# Patient Record
Sex: MALE | URBAN - METROPOLITAN AREA
[De-identification: names, ages, dates, MRNs, and addresses within clinical notes are randomized per-mention and may not be internally consistent; named-entity substitution may affect disease eponyms.]

---

## 2021-03-13 ENCOUNTER — EMERGENCY (EMERGENCY)
Facility: HOSPITAL | Age: 56
LOS: 1 days | Discharge: HOME | End: 2021-03-13
Attending: EMERGENCY MEDICINE | Admitting: EMERGENCY MEDICINE
Payer: MEDICAID

## 2021-03-13 VITALS
RESPIRATION RATE: 16 BRPM | DIASTOLIC BLOOD PRESSURE: 103 MMHG | HEART RATE: 92 BPM | TEMPERATURE: 98 F | SYSTOLIC BLOOD PRESSURE: 186 MMHG | OXYGEN SATURATION: 97 %

## 2021-03-13 PROCEDURE — 12011 RPR F/E/E/N/L/M 2.5 CM/<: CPT

## 2021-03-13 PROCEDURE — 99285 EMERGENCY DEPT VISIT HI MDM: CPT | Mod: 25

## 2021-03-13 RX ORDER — TETANUS TOXOID, REDUCED DIPHTHERIA TOXOID AND ACELLULAR PERTUSSIS VACCINE, ADSORBED 5; 2.5; 8; 8; 2.5 [IU]/.5ML; [IU]/.5ML; UG/.5ML; UG/.5ML; UG/.5ML
0.5 SUSPENSION INTRAMUSCULAR ONCE
Refills: 0 | Status: COMPLETED | OUTPATIENT
Start: 2021-03-13 | End: 2021-03-13

## 2021-03-13 NOTE — ED PROVIDER NOTE - PATIENT PORTAL LINK FT
You can access the FollowMyHealth Patient Portal offered by [facility] by registering at the following website: [email or web address]. By joining [facility]’s FollowMyHealth portal, you will also be able to view your health information using other applications (apps) compatible with our system.

## 2021-03-13 NOTE — ED PROVIDER NOTE - CLINICAL SUMMARY MEDICAL DECISION MAKING FREE TEXT BOX
Satya, PGY3- facial trauma with glasses causing lac. No LOC or asa/AC meds but intoxicated. No other injuries. Unsure of tetanus. Given intox will obtain cross sectional imaging of head/cspine/face. Adacel. Repair eyelid. Not concerning for globe injury or entrapment. Visual acuity intact.

## 2021-03-13 NOTE — ED PROVIDER NOTE - PHYSICAL EXAMINATION
General: alert, conversant, looks well, vitals reassuring  Head: atraumatic, normocephalic  Eyes: PERRL, EOMI, no scleral icterus  ENT: no epistaxis, moist mucous membranes, normal phonation, airway patent  Neck: full ROM, no midline ttp, trachea midline, no ecchymosis/hematoma   CV: RRR, no murmurs, HDS  Pulm: lungs CTA b/l, no wheezing, no respiratory distress  GI: abd soft, non tender, no guarding/rebound/masses  Back: normal ROM, no midline ttp, no signs of trauma  Extremities: normal ROM, joints stable, distal pulses intact, no edema  Neuro: awake, alert, knows self, hospital, situation, mildly intoxicated, PERRL, 5/5 strength in extremities   Derm: warm, dry, normal color, L eyelid laceration

## 2021-03-13 NOTE — ED PROVIDER NOTE - CARE PLAN
Principal Discharge DX:	Fall, initial encounter  Secondary Diagnosis:	Alcohol intoxication   Principal Discharge DX:	Fall, initial encounter  Secondary Diagnosis:	Alcohol intoxication  Secondary Diagnosis:	Laceration of eye region   Principal Discharge DX:	Laceration of eye region  Secondary Diagnosis:	Alcohol intoxication  Secondary Diagnosis:	Fall, initial encounter

## 2021-03-13 NOTE — ED PROVIDER NOTE - ATTENDING CONTRIBUTION TO CARE
57 y/o M with no significant PMH here after a fall at home.  Pt reports he had been drinking tonight (had 2-3 cups of whiskey) and had fallen forward.  Pt struck the left side of his head against the ground, was wearing glasses which broke and sustained a laceration to L face.  Pt denies LOC.  No complaints.  He is not a daily drinker.  No drug use.  Well appearing, lying in stretcher, awake and alert, nontoxic.  VSS.  (+)swelling and laceration lateral to left eyebrow, no active bleeding.  EOMI no proptosis or pain with movement.  Neck supple, no midline tenderness.  Spine normal.  Lungs cta bl.  Cards nl S1/S2, RRR, no MRG.  Abd soft ntnd.  All extremities are intact, FROM no gross deformities.  Plan for tdap, imaging, lac repair, reassess for sobriety.

## 2021-03-13 NOTE — ED PROVIDER NOTE - OBJECTIVE STATEMENT
56 yoM, denying PMHx, presenting to ED s/p fall with causing eye glasses to cause lac to L eye region. Had been drinking, multiple whisky drinks. No LOC. Denies any ASA or anti coagulation medications. Denies any visual changes. No pain with EOM. Denies other injuries from fall. No recent illness. Unsure of tetanus status.    caveat due to mild intoxication.

## 2021-03-13 NOTE — ED PROVIDER NOTE - NSFOLLOWUPINSTRUCTIONS_ED_ALL_ED_FT
You had a fall with an injury to your eye lid.    You had stitches placed.    Return to ER in 5 days for removal.    No injuries on your CT scans.    Please return to ER for new or concerning symptoms.

## 2021-03-13 NOTE — ED ADULT TRIAGE NOTE - CHIEF COMPLAINT QUOTE
c/o fall after 3 shots of whiskey. PT has laceration to L eye from glasses. Denies LOC, dizziness, N+V, ETOH withdrawal, blood thinners, PMHx. PT states he only drinks socially on weekends. c/o fall after 3 shots of whiskey. PT has laceration to L eye from glasses. Denies LOC, dizziness, N+V, ETOH withdrawal, blood thinners, PMHx. PT states he only drinks socially on weekends. Pt calm and cooperative.

## 2021-03-14 VITALS
SYSTOLIC BLOOD PRESSURE: 176 MMHG | OXYGEN SATURATION: 97 % | HEART RATE: 104 BPM | RESPIRATION RATE: 18 BRPM | DIASTOLIC BLOOD PRESSURE: 102 MMHG

## 2021-03-14 PROCEDURE — 70450 CT HEAD/BRAIN W/O DYE: CPT | Mod: 26

## 2021-03-14 PROCEDURE — 72125 CT NECK SPINE W/O DYE: CPT | Mod: 26

## 2021-03-14 PROCEDURE — 70486 CT MAXILLOFACIAL W/O DYE: CPT | Mod: 26

## 2021-03-14 RX ADMIN — TETANUS TOXOID, REDUCED DIPHTHERIA TOXOID AND ACELLULAR PERTUSSIS VACCINE, ADSORBED 0.5 MILLILITER(S): 5; 2.5; 8; 8; 2.5 SUSPENSION INTRAMUSCULAR at 00:11

## 2021-03-14 NOTE — ED PROCEDURE NOTE - PROCEDURE ADDITIONAL DETAILS
4 interrupted 5-0 prolene skin sutures placed without complications, patient given verbal instructions to follow up in 5 days for suture removal at PMD/UC/ED

## 2021-03-14 NOTE — ED PROCEDURE NOTE - ATTENDING CONTRIBUTION TO CARE
I have personally seen and examined this patient. I have fully participated in the care of this patient. I was present at all key portions of the procedure.

## 2021-03-14 NOTE — ED ADULT NURSE NOTE - OBJECTIVE STATEMENT
Pt arrives to room 12 with dried blood to left side of face near eye.  Pt reports he drank too much and possibly fell.  Pt denies LOC.  Pt states he drank 3 shots of whiskey.  Pt denies being on blood thinners    c/o fall after 3 shots of whiskey. PT has laceration to L eye from glasses. Denies LOC, dizziness, N+V, ETOH withdrawal, blood thinners, PMHx. PT states he only drinks socially on weekends. Pt calm and cooperative. Pt arrives to room 12 with dried blood to left side of face near eye.  Pt reports he drank too much and possibly fell.  Pt denies LOC.  Pt states he drank 3 shots of whiskey.  Pt denies being on blood thinners.  Pt states he does not drink daily.  Pt medicated as per EMAR, pt awaiting CT.

## 2021-03-14 NOTE — ED ADULT NURSE NOTE - CHIEF COMPLAINT QUOTE
c/o fall after 3 shots of whiskey. PT has laceration to L eye from glasses. Denies LOC, dizziness, N+V, ETOH withdrawal, blood thinners, PMHx. PT states he only drinks socially on weekends. Pt calm and cooperative.

## 2021-03-16 ENCOUNTER — EMERGENCY (EMERGENCY)
Facility: HOSPITAL | Age: 56
LOS: 1 days | Discharge: HOME | End: 2021-03-16
Attending: EMERGENCY MEDICINE | Admitting: EMERGENCY MEDICINE
Payer: MEDICAID

## 2021-03-16 ENCOUNTER — INPATIENT (INPATIENT)
Facility: HOSPITAL | Age: 56
LOS: 2 days | Discharge: HOME | DRG: 516 | End: 2021-03-19
Attending: SURGERY | Admitting: SURGERY
Payer: MEDICAID

## 2021-03-16 VITALS
DIASTOLIC BLOOD PRESSURE: 114 MMHG | OXYGEN SATURATION: 100 % | TEMPERATURE: 99 F | RESPIRATION RATE: 16 BRPM | HEART RATE: 96 BPM | SYSTOLIC BLOOD PRESSURE: 198 MMHG

## 2021-03-16 VITALS
RESPIRATION RATE: 18 BRPM | DIASTOLIC BLOOD PRESSURE: 106 MMHG | OXYGEN SATURATION: 99 % | SYSTOLIC BLOOD PRESSURE: 184 MMHG | HEART RATE: 99 BPM | TEMPERATURE: 98 F

## 2021-03-16 VITALS
OXYGEN SATURATION: 93 % | SYSTOLIC BLOOD PRESSURE: 161 MMHG | HEART RATE: 98 BPM | RESPIRATION RATE: 18 BRPM | WEIGHT: 154.98 LBS | DIASTOLIC BLOOD PRESSURE: 105 MMHG

## 2021-03-16 LAB
ALBUMIN SERPL ELPH-MCNC: 4.1 G/DL — SIGNIFICANT CHANGE UP (ref 3.3–5)
ALBUMIN SERPL ELPH-MCNC: 4.1 G/DL — SIGNIFICANT CHANGE UP (ref 3.3–5)
ALP SERPL-CCNC: 100 U/L — SIGNIFICANT CHANGE UP (ref 40–120)
ALP SERPL-CCNC: 83 U/L — SIGNIFICANT CHANGE UP (ref 40–120)
ALT FLD-CCNC: 24 U/L — SIGNIFICANT CHANGE UP (ref 4–41)
ALT FLD-CCNC: 26 U/L — SIGNIFICANT CHANGE UP (ref 4–41)
ANION GAP SERPL CALC-SCNC: 12 MMOL/L — SIGNIFICANT CHANGE UP (ref 7–14)
ANION GAP SERPL CALC-SCNC: 9 MMOL/L — SIGNIFICANT CHANGE UP (ref 7–14)
APTT BLD: 19.2 SEC — LOW (ref 27–36.3)
AST SERPL-CCNC: 23 U/L — SIGNIFICANT CHANGE UP (ref 4–40)
AST SERPL-CCNC: 49 U/L — HIGH (ref 4–40)
BASOPHILS # BLD AUTO: 0.03 K/UL — SIGNIFICANT CHANGE UP (ref 0–0.2)
BASOPHILS NFR BLD AUTO: 0.3 % — SIGNIFICANT CHANGE UP (ref 0–2)
BILIRUB SERPL-MCNC: 0.7 MG/DL — SIGNIFICANT CHANGE UP (ref 0.2–1.2)
BILIRUB SERPL-MCNC: 0.7 MG/DL — SIGNIFICANT CHANGE UP (ref 0.2–1.2)
BLD GP AB SCN SERPL QL: NEGATIVE — SIGNIFICANT CHANGE UP
BUN SERPL-MCNC: 15 MG/DL — SIGNIFICANT CHANGE UP (ref 7–23)
BUN SERPL-MCNC: 16 MG/DL — SIGNIFICANT CHANGE UP (ref 7–23)
CALCIUM SERPL-MCNC: 9.1 MG/DL — SIGNIFICANT CHANGE UP (ref 8.4–10.5)
CALCIUM SERPL-MCNC: 9.3 MG/DL — SIGNIFICANT CHANGE UP (ref 8.4–10.5)
CHLORIDE SERPL-SCNC: 100 MMOL/L — SIGNIFICANT CHANGE UP (ref 98–107)
CHLORIDE SERPL-SCNC: 97 MMOL/L — LOW (ref 98–107)
CO2 SERPL-SCNC: 25 MMOL/L — SIGNIFICANT CHANGE UP (ref 22–31)
CO2 SERPL-SCNC: 26 MMOL/L — SIGNIFICANT CHANGE UP (ref 22–31)
CREAT SERPL-MCNC: 0.72 MG/DL — SIGNIFICANT CHANGE UP (ref 0.5–1.3)
CREAT SERPL-MCNC: 0.77 MG/DL — SIGNIFICANT CHANGE UP (ref 0.5–1.3)
EOSINOPHIL # BLD AUTO: 0 K/UL — SIGNIFICANT CHANGE UP (ref 0–0.5)
EOSINOPHIL NFR BLD AUTO: 0 % — SIGNIFICANT CHANGE UP (ref 0–6)
ETHANOL SERPL-MCNC: <10 MG/DL — SIGNIFICANT CHANGE UP
GLUCOSE SERPL-MCNC: 92 MG/DL — SIGNIFICANT CHANGE UP (ref 70–99)
GLUCOSE SERPL-MCNC: 95 MG/DL — SIGNIFICANT CHANGE UP (ref 70–99)
HCT VFR BLD CALC: 45.9 % — SIGNIFICANT CHANGE UP (ref 39–50)
HGB BLD-MCNC: 14.8 G/DL — SIGNIFICANT CHANGE UP (ref 13–17)
IANC: 9.23 K/UL — HIGH (ref 1.5–8.5)
IMM GRANULOCYTES NFR BLD AUTO: 0.4 % — SIGNIFICANT CHANGE UP (ref 0–1.5)
INR BLD: 0.98 RATIO — SIGNIFICANT CHANGE UP (ref 0.88–1.16)
LYMPHOCYTES # BLD AUTO: 1.25 K/UL — SIGNIFICANT CHANGE UP (ref 1–3.3)
LYMPHOCYTES # BLD AUTO: 11 % — LOW (ref 13–44)
MCHC RBC-ENTMCNC: 29.6 PG — SIGNIFICANT CHANGE UP (ref 27–34)
MCHC RBC-ENTMCNC: 32.2 GM/DL — SIGNIFICANT CHANGE UP (ref 32–36)
MCV RBC AUTO: 91.8 FL — SIGNIFICANT CHANGE UP (ref 80–100)
MONOCYTES # BLD AUTO: 0.78 K/UL — SIGNIFICANT CHANGE UP (ref 0–0.9)
MONOCYTES NFR BLD AUTO: 6.9 % — SIGNIFICANT CHANGE UP (ref 2–14)
NEUTROPHILS # BLD AUTO: 9.23 K/UL — HIGH (ref 1.8–7.4)
NEUTROPHILS NFR BLD AUTO: 81.4 % — HIGH (ref 43–77)
NRBC # BLD: 0 /100 WBCS — SIGNIFICANT CHANGE UP
NRBC # FLD: 0 K/UL — SIGNIFICANT CHANGE UP
PLATELET # BLD AUTO: 252 K/UL — SIGNIFICANT CHANGE UP (ref 150–400)
POTASSIUM SERPL-MCNC: 4.4 MMOL/L — SIGNIFICANT CHANGE UP (ref 3.5–5.3)
POTASSIUM SERPL-MCNC: SIGNIFICANT CHANGE UP MMOL/L (ref 3.5–5.3)
POTASSIUM SERPL-SCNC: 4.4 MMOL/L — SIGNIFICANT CHANGE UP (ref 3.5–5.3)
POTASSIUM SERPL-SCNC: SIGNIFICANT CHANGE UP MMOL/L (ref 3.5–5.3)
PROT SERPL-MCNC: 7.5 G/DL — SIGNIFICANT CHANGE UP (ref 6–8.3)
PROT SERPL-MCNC: SIGNIFICANT CHANGE UP G/DL (ref 6–8.3)
PROTHROM AB SERPL-ACNC: 11.2 SEC — SIGNIFICANT CHANGE UP (ref 10.6–13.6)
RBC # BLD: 5 M/UL — SIGNIFICANT CHANGE UP (ref 4.2–5.8)
RBC # FLD: 14.2 % — SIGNIFICANT CHANGE UP (ref 10.3–14.5)
RH IG SCN BLD-IMP: POSITIVE — SIGNIFICANT CHANGE UP
SODIUM SERPL-SCNC: 131 MMOL/L — LOW (ref 135–145)
SODIUM SERPL-SCNC: 138 MMOL/L — SIGNIFICANT CHANGE UP (ref 135–145)
TROPONIN T, HIGH SENSITIVITY RESULT: <6 NG/L — SIGNIFICANT CHANGE UP
WBC # BLD: 11.34 K/UL — HIGH (ref 3.8–10.5)
WBC # FLD AUTO: 11.34 K/UL — HIGH (ref 3.8–10.5)

## 2021-03-16 PROCEDURE — 71045 X-RAY EXAM CHEST 1 VIEW: CPT | Mod: 26

## 2021-03-16 PROCEDURE — 71250 CT THORAX DX C-: CPT | Mod: 26

## 2021-03-16 PROCEDURE — 73030 X-RAY EXAM OF SHOULDER: CPT | Mod: 26,LT

## 2021-03-16 PROCEDURE — 99285 EMERGENCY DEPT VISIT HI MDM: CPT

## 2021-03-16 RX ORDER — LIDOCAINE 4 G/100G
1 CREAM TOPICAL ONCE
Refills: 0 | Status: COMPLETED | OUTPATIENT
Start: 2021-03-16 | End: 2021-03-16

## 2021-03-16 RX ORDER — MORPHINE SULFATE 50 MG/1
4 CAPSULE, EXTENDED RELEASE ORAL ONCE
Refills: 0 | Status: DISCONTINUED | OUTPATIENT
Start: 2021-03-16 | End: 2021-03-16

## 2021-03-16 RX ORDER — SODIUM CHLORIDE 9 MG/ML
1000 INJECTION INTRAMUSCULAR; INTRAVENOUS; SUBCUTANEOUS ONCE
Refills: 0 | Status: COMPLETED | OUTPATIENT
Start: 2021-03-16 | End: 2021-03-16

## 2021-03-16 RX ADMIN — MORPHINE SULFATE 4 MILLIGRAM(S): 50 CAPSULE, EXTENDED RELEASE ORAL at 21:25

## 2021-03-16 RX ADMIN — SODIUM CHLORIDE 1000 MILLILITER(S): 9 INJECTION INTRAMUSCULAR; INTRAVENOUS; SUBCUTANEOUS at 21:26

## 2021-03-16 RX ADMIN — LIDOCAINE 1 PATCH: 4 CREAM TOPICAL at 17:31

## 2021-03-16 NOTE — ED PROVIDER NOTE - PHYSICAL EXAMINATION
General: Well developed, well nourished  HEENT: Normocephalic. Ecchymosis around L eye with laceration repair, EOMI, Trachea midline.   Cardiac: Normal S1 and S2 w/ RRR. No MRG.  Pulmonary: CTA bilaterally. No increased WOB, speaking in full sentences.   Abdominal: Soft, NTND  Neurologic: Strength 5/5 in all extremities. No focal sensory or motor deficits.  Musculoskeletal: tenderness over L clavicle, L scapula and L ribs. Immobilized in sling  Vascular: Warm and well perfused  Skin: Color appropriate for race.   Psychiatric: Appropriate mood and affect. No apparent risk to self or others.  Rolf Delgadillo M.D. PGY-3 General: Well developed, well nourished  HEENT: Normocephalic. Ecchymosis around L eye with laceration repair, EOMI, Trachea midline.   Cardiac: Normal S1 and S2 w/ RRR. No MRG.  Pulmonary: CTA bilaterally. No increased WOB, speaking in full sentences.   Abdominal: Soft, NTND  Neurologic: Strength 5/5 in all extremities. No focal sensory or motor deficits.  Musculoskeletal: tenderness over L clavicle, L scapula and L ribs. Immobilized in sling to left arm, intact peripheral pulses, intact motor and sensation to hand of left arm  Vascular: Warm and well perfused  Skin: Color appropriate for race.   Psychiatric: Appropriate mood and affect. No apparent risk to self or others.  Rolf Delgadillo M.D. PGY-3

## 2021-03-16 NOTE — ED PROVIDER NOTE - CLINICAL SUMMARY MEDICAL DECISION MAKING FREE TEXT BOX
56M presents with multiple rib fractures, clavicle and scapular fracture after a fall on Saturday. Currently hemodynamically stable, breathing on RA, pain controlled. Will discuss with trauma surg. May require admission for multiple rib fx. 56M presents with multiple rib fractures, clavicle and scapular fracture after a fall on Saturday. Currently hemodynamically stable, breathing on RA, pain controlled. Will discuss with trauma surg. May require admission for multiple rib fx.  labs and imaging reviewed from transferring hospital, surgery consulted, will offer analgesia and antiemetic prn  Will follow up on surgical consult, reassess and disposition to the inpatient team as clinically indicated.

## 2021-03-16 NOTE — ED PROVIDER NOTE - OBJECTIVE STATEMENT
57 y/o M with no reported PMH presenting with CP. Patient was seen in ED on 3/13 s/p mechanical fall at a party, fell down 3 steps. Was evaluated in ED and DC home. Patient since then has had left shoulder and chest pain. Severe pain when trying to move left arm. Minimal relief with ibuprofen at home. No numbness, tingling, SOB, fevers, chills, n/v/d, abd pain, hematuria 55 y/o M with no reported PMH presenting with CP. Patient was seen in ED on 3/13 s/p mechanical fall at a party, fell down 3 steps. Was evaluated in ED and DC home with repair of facial laceration and negative CT head/cspine/face. Patient since then has had left shoulder and chest pain. Severe pain when trying to move left arm and deep inspiration. Minimal relief with ibuprofen at home. No numbness, tingling, SOB, fevers, chills, n/v/d, abd pain, hematuria    BERTHA

## 2021-03-16 NOTE — ED ADULT NURSE NOTE - OBJECTIVE STATEMENT
break coverage RN - pt received in room 2. Pt was seen here on Saturday s/p mechanical fall and discharged. Since then he has been having L sided chest pain and L shoulder/arm pain. Pt's L shoulder noted to be bruised, and pt having difficulty moving extremity. No numbness or tingling. Sensation intact bilaterally. Pt also has bruising to L eye. States he has chest pain specifically when he moves. Sinus tachycardiac on cardiac monitor. Resp even and unlabored. Denies SOB, trouble breathing, blood thinner use. Pending MD miller at this time. Will continue to monitor.

## 2021-03-16 NOTE — ED PROVIDER NOTE - PROGRESS NOTE DETAILS
Pt with multiple fracutres on chest ct,  will transfer for [facility] for trauma consult and management given polytrauma. Abd s/nt, no ecchymosis, will hold off on abd CT at this time and transfer, pt HDS.

## 2021-03-16 NOTE — ED ADULT TRIAGE NOTE - CHIEF COMPLAINT QUOTE
Pt presents to ED ambulatory from home with c/o chest pain x 3 days. Pt tripped and fell Saturday and was seen in this ED. Pt relates the next day he started having pain in the L side of chest. Pt presents to ED ambulatory from home with c/o chest pain x 3 days. Pt tripped and fell Saturday and was seen in this ED. Pt relates the next day he started having pain in the L side of chest. Cynthiajimbo Montano (sister) [phone].

## 2021-03-16 NOTE — ED ADULT NURSE NOTE - NSIMPLEMENTINTERV_GEN_ALL_ED
Implemented All Fall Risk Interventions:  [place] to call system. Call bell, personal items and telephone within reach. Instruct patient to call for assistance. Room bathroom lighting operational. Non-slip footwear when patient is off stretcher. Physically safe environment: no spills, clutter or unnecessary equipment. Stretcher in lowest position, wheels locked, appropriate side rails in place. Provide visual cue, wrist band, yellow gown, etc. Monitor gait and stability. Monitor for mental status changes and reorient to person, place, and time. Review medications for side effects contributing to fall risk. Reinforce activity limits and safety measures with patient and family.

## 2021-03-16 NOTE — ED PROVIDER NOTE - ATTENDING CONTRIBUTION TO CARE
See MDM above.  Patient endorsed to the surgical team at the time of admission. Based on patient's history and physical exam, as well as the results of today's workup, I feel that patient warrants admission to the hospital for further workup/evaluation and continued management. I discussed the findings of today's workup with the patient and addressed the patient's questions and concerns. The patient was agreeable with admission. Our team spoke with the inpatient receiving team who accepted the patient for admission and subsequently took over the patient's care at the time of admission. The receiving team will follow up on pending labs, analgesia, any clinical imaging results, ancillary findings, reassess, and disposition as clinically indicated. Details of patient and plan conveyed to receiving physician team and conveyed back for understanding. There were no questions at this time about the patient's status, disposition, and plan. Patient's care to be taken over by receiving physician team at this time, all decisions regarding the progression of care will be made at their discretion.

## 2021-03-16 NOTE — ED PROVIDER NOTE - NS ED ROS FT
REVIEW OF SYSTEMS:  General:  no fever, no chills  HEENT: no headache, no vision changes  Cardiac: no chest pain, no palpitations  Respiratory: no cough, no shortness of breath  Gastrointestinal: no abdominal pain, no nausea, no vomiting, no diarrhea  Genitourinary: no hematuria, no dysuria, no urinary frequency  Extremities: +left back and chest pain. no extremity swelling, no extremity pain  Neuro: no focal weakness, no numbness/tingling of the extremities, no decreased sensation  Heme: no easy bleeding, no easy bruising  Skin: no jaundice,  no rashes, no lesions  All other ROS as documented in HPI  -Rolf Delgadillo, PGY-3

## 2021-03-16 NOTE — ED PROVIDER NOTE - CLINICAL SUMMARY MEDICAL DECISION MAKING FREE TEXT BOX
DO Andres PGY-2: 55 y/o M presenting with ongoing shoulder pain after mechanical fall, will obtain plain films to eval for fx. Possible muscle tear. Lidocaine patch for pain control, dispo pending results DO Andres PGY-2: 55 y/o M presenting with ongoing shoulder pain after mechanical fall, will obtain plain films to eval for fx and chest CT for high suspicion for multiple rib fracture on left side.

## 2021-03-16 NOTE — ED PROVIDER NOTE - OBJECTIVE STATEMENT
56M presents as trauma transfer. Pt states he was drinking with his family on Saturday when he had a fall down steps. States he had a laceration which was repaired in the ER. After coming back from ER he started to have a lot of pain in his L back and ribs. Today he went back to [facility] and was found to have L scapular, L clavicular, and L 3-7 rib fx. States was given morphine at [facility] and pain is currently controlled.

## 2021-03-16 NOTE — ED PROVIDER NOTE - PHYSICAL EXAMINATION
gen: well appearing  Mentation: AAO x 3  psych: mood appropriate  ENT: airway patent  Eyes: conjunctivae clear bilaterally  Cardio: RRR, no m/r/g  Resp: normal BS b/l  GI: s/nt/nd  : no CVA tenderness  Neuro: sensation and motor function intact, CN 2-12 intact  Skin: ecchymosis over left shoulder; no ecchymosis over abdomen  MSK: severely limited ROM of left shoulder, unable to hold up; TTP over left clavicle; normal movement of all other extremities; no midline tenderness  Lymph/Vasc: no LE edema gen: well appearing  Mentation: AAO x 3  psych: mood appropriate  ENT: airway patent  Eyes: conjunctivae clear bilaterally, EOMI  Cardio: RRR, no m/r/g  Resp: normal BS b/l  GI: s/nt/nd  : no CVA tenderness  Neuro: no midline spinal TTP or step offs, sensation intact, full motor strength to all extremities with exception of lifting L arm 2/2 pain, CN 2-12 intact  Skin: ecchymosis over left shoulder and upper chest; no ecchymosis over abdomen  MSK: severely limited ROM of left shoulder and L lateral chest, unable to hold up; TTP over left clavicle; normal movement of all other extremities; no midline tenderness  Lymph/Vasc: no LE edema      BERTHA

## 2021-03-16 NOTE — ED ADULT NURSE NOTE - CHIEF COMPLAINT QUOTE
Pt presents to ED ambulatory from home with c/o chest pain x 3 days. Pt tripped and fell Saturday and was seen in this ED. Pt relates the next day he started having pain in the L side of chest. Cynthiajimbo Montano (sister) [phone].

## 2021-03-16 NOTE — ED PROVIDER NOTE - CARE PLAN
Principal Discharge DX:	Rib fractures  Secondary Diagnosis:	Scapular fracture  Secondary Diagnosis:	Clavicular fracture

## 2021-03-16 NOTE — ED PROVIDER NOTE - PROGRESS NOTE DETAILS
Kaye Wynne MD PGY-3  patient signed out to me pending surgical eval. spoke with surgery, patient can be admitted to sicu under dr. santacruz. no additional imaging needed at this time per surgery

## 2021-03-17 ENCOUNTER — TRANSCRIPTION ENCOUNTER (OUTPATIENT)
Age: 56
End: 2021-03-17

## 2021-03-17 DIAGNOSIS — S42.009A FRACTURE OF UNSPECIFIED PART OF UNSPECIFIED CLAVICLE, INITIAL ENCOUNTER FOR CLOSED FRACTURE: ICD-10-CM

## 2021-03-17 DIAGNOSIS — S42.109A FRACTURE OF UNSPECIFIED PART OF SCAPULA, UNSPECIFIED SHOULDER, INITIAL ENCOUNTER FOR CLOSED FRACTURE: ICD-10-CM

## 2021-03-17 DIAGNOSIS — Z02.9 ENCOUNTER FOR ADMINISTRATIVE EXAMINATIONS, UNSPECIFIED: ICD-10-CM

## 2021-03-17 DIAGNOSIS — D72.829 ELEVATED WHITE BLOOD CELL COUNT, UNSPECIFIED: ICD-10-CM

## 2021-03-17 DIAGNOSIS — S22.49XA MULTIPLE FRACTURES OF RIBS, UNSPECIFIED SIDE, INITIAL ENCOUNTER FOR CLOSED FRACTURE: ICD-10-CM

## 2021-03-17 DIAGNOSIS — I10 ESSENTIAL (PRIMARY) HYPERTENSION: ICD-10-CM

## 2021-03-17 PROBLEM — Z00.00 ENCOUNTER FOR PREVENTIVE HEALTH EXAMINATION: Status: ACTIVE | Noted: 2021-03-17

## 2021-03-17 LAB
ANION GAP SERPL CALC-SCNC: 12 MMOL/L — SIGNIFICANT CHANGE UP (ref 5–17)
APTT BLD: 30.1 SEC — SIGNIFICANT CHANGE UP (ref 27.5–35.5)
BLD GP AB SCN SERPL QL: NEGATIVE — SIGNIFICANT CHANGE UP
BUN SERPL-MCNC: 13 MG/DL — SIGNIFICANT CHANGE UP (ref 7–23)
CALCIUM SERPL-MCNC: 8.9 MG/DL — SIGNIFICANT CHANGE UP (ref 8.4–10.5)
CHLORIDE SERPL-SCNC: 102 MMOL/L — SIGNIFICANT CHANGE UP (ref 96–108)
CO2 SERPL-SCNC: 24 MMOL/L — SIGNIFICANT CHANGE UP (ref 22–31)
CREAT SERPL-MCNC: 0.68 MG/DL — SIGNIFICANT CHANGE UP (ref 0.5–1.3)
GLUCOSE SERPL-MCNC: 95 MG/DL — SIGNIFICANT CHANGE UP (ref 70–99)
HCT VFR BLD CALC: 45.1 % — SIGNIFICANT CHANGE UP (ref 39–50)
HGB BLD-MCNC: 14.7 G/DL — SIGNIFICANT CHANGE UP (ref 13–17)
INR BLD: 0.99 RATIO — SIGNIFICANT CHANGE UP (ref 0.88–1.16)
MAGNESIUM SERPL-MCNC: 2 MG/DL — SIGNIFICANT CHANGE UP (ref 1.6–2.6)
MCHC RBC-ENTMCNC: 30 PG — SIGNIFICANT CHANGE UP (ref 27–34)
MCHC RBC-ENTMCNC: 32.6 GM/DL — SIGNIFICANT CHANGE UP (ref 32–36)
MCV RBC AUTO: 92 FL — SIGNIFICANT CHANGE UP (ref 80–100)
NRBC # BLD: 0 /100 WBCS — SIGNIFICANT CHANGE UP (ref 0–0)
PHOSPHATE SERPL-MCNC: 3.1 MG/DL — SIGNIFICANT CHANGE UP (ref 2.5–4.5)
PLATELET # BLD AUTO: 218 K/UL — SIGNIFICANT CHANGE UP (ref 150–400)
POTASSIUM SERPL-MCNC: 3.7 MMOL/L — SIGNIFICANT CHANGE UP (ref 3.5–5.3)
POTASSIUM SERPL-SCNC: 3.7 MMOL/L — SIGNIFICANT CHANGE UP (ref 3.5–5.3)
PROTHROM AB SERPL-ACNC: 11.9 SEC — SIGNIFICANT CHANGE UP (ref 10.6–13.6)
RBC # BLD: 4.9 M/UL — SIGNIFICANT CHANGE UP (ref 4.2–5.8)
RBC # FLD: 14.1 % — SIGNIFICANT CHANGE UP (ref 10.3–14.5)
RH IG SCN BLD-IMP: POSITIVE — SIGNIFICANT CHANGE UP
SARS-COV-2 RNA SPEC QL NAA+PROBE: SIGNIFICANT CHANGE UP
SODIUM SERPL-SCNC: 138 MMOL/L — SIGNIFICANT CHANGE UP (ref 135–145)
WBC # BLD: 10.75 K/UL — HIGH (ref 3.8–10.5)
WBC # FLD AUTO: 10.75 K/UL — HIGH (ref 3.8–10.5)

## 2021-03-17 PROCEDURE — 99223 1ST HOSP IP/OBS HIGH 75: CPT

## 2021-03-17 RX ORDER — ENOXAPARIN SODIUM 100 MG/ML
40 INJECTION SUBCUTANEOUS EVERY 24 HOURS
Refills: 0 | Status: COMPLETED | OUTPATIENT
Start: 2021-03-17 | End: 2021-03-17

## 2021-03-17 RX ORDER — HYDROMORPHONE HYDROCHLORIDE 2 MG/ML
0.25 INJECTION INTRAMUSCULAR; INTRAVENOUS; SUBCUTANEOUS
Refills: 0 | Status: DISCONTINUED | OUTPATIENT
Start: 2021-03-17 | End: 2021-03-18

## 2021-03-17 RX ORDER — INFLUENZA VIRUS VACCINE 15; 15; 15; 15 UG/.5ML; UG/.5ML; UG/.5ML; UG/.5ML
0.5 SUSPENSION INTRAMUSCULAR ONCE
Refills: 0 | Status: DISCONTINUED | OUTPATIENT
Start: 2021-03-17 | End: 2021-03-19

## 2021-03-17 RX ORDER — OXYCODONE HYDROCHLORIDE 5 MG/1
2.5 TABLET ORAL EVERY 6 HOURS
Refills: 0 | Status: DISCONTINUED | OUTPATIENT
Start: 2021-03-17 | End: 2021-03-17

## 2021-03-17 RX ORDER — FOLIC ACID 0.8 MG
1 TABLET ORAL DAILY
Refills: 0 | Status: DISCONTINUED | OUTPATIENT
Start: 2021-03-17 | End: 2021-03-18

## 2021-03-17 RX ORDER — OXYCODONE HYDROCHLORIDE 5 MG/1
10 TABLET ORAL EVERY 4 HOURS
Refills: 0 | Status: DISCONTINUED | OUTPATIENT
Start: 2021-03-17 | End: 2021-03-17

## 2021-03-17 RX ORDER — HYDROMORPHONE HYDROCHLORIDE 2 MG/ML
0.5 INJECTION INTRAMUSCULAR; INTRAVENOUS; SUBCUTANEOUS ONCE
Refills: 0 | Status: DISCONTINUED | OUTPATIENT
Start: 2021-03-17 | End: 2021-03-17

## 2021-03-17 RX ORDER — KETOROLAC TROMETHAMINE 30 MG/ML
15 SYRINGE (ML) INJECTION EVERY 6 HOURS
Refills: 0 | Status: DISCONTINUED | OUTPATIENT
Start: 2021-03-17 | End: 2021-03-18

## 2021-03-17 RX ORDER — FOLIC ACID 0.8 MG
1 TABLET ORAL DAILY
Refills: 0 | Status: DISCONTINUED | OUTPATIENT
Start: 2021-03-17 | End: 2021-03-17

## 2021-03-17 RX ORDER — ACETAMINOPHEN 500 MG
975 TABLET ORAL EVERY 6 HOURS
Refills: 0 | Status: DISCONTINUED | OUTPATIENT
Start: 2021-03-17 | End: 2021-03-18

## 2021-03-17 RX ORDER — ACETAMINOPHEN 500 MG
975 TABLET ORAL EVERY 6 HOURS
Refills: 0 | Status: DISCONTINUED | OUTPATIENT
Start: 2021-03-17 | End: 2021-03-17

## 2021-03-17 RX ORDER — THIAMINE MONONITRATE (VIT B1) 100 MG
100 TABLET ORAL DAILY
Refills: 0 | Status: DISCONTINUED | OUTPATIENT
Start: 2021-03-17 | End: 2021-03-18

## 2021-03-17 RX ORDER — CHLORHEXIDINE GLUCONATE 213 G/1000ML
1 SOLUTION TOPICAL
Refills: 0 | Status: DISCONTINUED | OUTPATIENT
Start: 2021-03-18 | End: 2021-03-18

## 2021-03-17 RX ORDER — OXYCODONE HYDROCHLORIDE 5 MG/1
5 TABLET ORAL EVERY 4 HOURS
Refills: 0 | Status: DISCONTINUED | OUTPATIENT
Start: 2021-03-17 | End: 2021-03-17

## 2021-03-17 RX ORDER — SENNA PLUS 8.6 MG/1
2 TABLET ORAL AT BEDTIME
Refills: 0 | Status: DISCONTINUED | OUTPATIENT
Start: 2021-03-17 | End: 2021-03-18

## 2021-03-17 RX ORDER — POTASSIUM CHLORIDE 20 MEQ
20 PACKET (EA) ORAL ONCE
Refills: 0 | Status: COMPLETED | OUTPATIENT
Start: 2021-03-17 | End: 2021-03-17

## 2021-03-17 RX ORDER — POLYETHYLENE GLYCOL 3350 17 G/17G
17 POWDER, FOR SOLUTION ORAL DAILY
Refills: 0 | Status: DISCONTINUED | OUTPATIENT
Start: 2021-03-17 | End: 2021-03-18

## 2021-03-17 RX ORDER — IBUPROFEN 200 MG
400 TABLET ORAL EVERY 6 HOURS
Refills: 0 | Status: DISCONTINUED | OUTPATIENT
Start: 2021-03-17 | End: 2021-03-17

## 2021-03-17 RX ORDER — ACETAMINOPHEN 500 MG
1000 TABLET ORAL ONCE
Refills: 0 | Status: COMPLETED | OUTPATIENT
Start: 2021-03-17 | End: 2021-03-17

## 2021-03-17 RX ORDER — OXYCODONE HYDROCHLORIDE 5 MG/1
2.5 TABLET ORAL EVERY 4 HOURS
Refills: 0 | Status: DISCONTINUED | OUTPATIENT
Start: 2021-03-17 | End: 2021-03-18

## 2021-03-17 RX ORDER — AMLODIPINE BESYLATE 2.5 MG/1
10 TABLET ORAL DAILY
Refills: 0 | Status: DISCONTINUED | OUTPATIENT
Start: 2021-03-17 | End: 2021-03-18

## 2021-03-17 RX ORDER — LIDOCAINE 4 G/100G
1 CREAM TOPICAL DAILY
Refills: 0 | Status: DISCONTINUED | OUTPATIENT
Start: 2021-03-17 | End: 2021-03-17

## 2021-03-17 RX ORDER — SODIUM CHLORIDE 9 MG/ML
1000 INJECTION, SOLUTION INTRAVENOUS
Refills: 0 | Status: DISCONTINUED | OUTPATIENT
Start: 2021-03-17 | End: 2021-03-18

## 2021-03-17 RX ORDER — GABAPENTIN 400 MG/1
100 CAPSULE ORAL THREE TIMES A DAY
Refills: 0 | Status: DISCONTINUED | OUTPATIENT
Start: 2021-03-17 | End: 2021-03-17

## 2021-03-17 RX ORDER — LIDOCAINE 4 G/100G
1 CREAM TOPICAL DAILY
Refills: 0 | Status: DISCONTINUED | OUTPATIENT
Start: 2021-03-18 | End: 2021-03-18

## 2021-03-17 RX ADMIN — Medication 15 MILLIGRAM(S): at 15:09

## 2021-03-17 RX ADMIN — Medication 15 MILLIGRAM(S): at 10:00

## 2021-03-17 RX ADMIN — Medication 1 TABLET(S): at 11:43

## 2021-03-17 RX ADMIN — LIDOCAINE 1 PATCH: 4 CREAM TOPICAL at 02:42

## 2021-03-17 RX ADMIN — Medication 15 MILLIGRAM(S): at 22:00

## 2021-03-17 RX ADMIN — ENOXAPARIN SODIUM 40 MILLIGRAM(S): 100 INJECTION SUBCUTANEOUS at 06:01

## 2021-03-17 RX ADMIN — Medication 400 MILLIGRAM(S): at 02:30

## 2021-03-17 RX ADMIN — Medication 975 MILLIGRAM(S): at 18:27

## 2021-03-17 RX ADMIN — Medication 975 MILLIGRAM(S): at 05:58

## 2021-03-17 RX ADMIN — Medication 975 MILLIGRAM(S): at 23:29

## 2021-03-17 RX ADMIN — Medication 400 MILLIGRAM(S): at 05:58

## 2021-03-17 RX ADMIN — LIDOCAINE 1 PATCH: 4 CREAM TOPICAL at 14:48

## 2021-03-17 RX ADMIN — Medication 15 MILLIGRAM(S): at 09:42

## 2021-03-17 RX ADMIN — LIDOCAINE 1 PATCH: 4 CREAM TOPICAL at 07:00

## 2021-03-17 RX ADMIN — Medication 20 MILLIEQUIVALENT(S): at 06:46

## 2021-03-17 RX ADMIN — SENNA PLUS 2 TABLET(S): 8.6 TABLET ORAL at 21:43

## 2021-03-17 RX ADMIN — Medication 100 MILLIGRAM(S): at 11:43

## 2021-03-17 RX ADMIN — HYDROMORPHONE HYDROCHLORIDE 0.5 MILLIGRAM(S): 2 INJECTION INTRAMUSCULAR; INTRAVENOUS; SUBCUTANEOUS at 02:55

## 2021-03-17 RX ADMIN — Medication 975 MILLIGRAM(S): at 12:49

## 2021-03-17 RX ADMIN — AMLODIPINE BESYLATE 10 MILLIGRAM(S): 2.5 TABLET ORAL at 13:09

## 2021-03-17 RX ADMIN — Medication 1 MILLIGRAM(S): at 11:43

## 2021-03-17 RX ADMIN — SODIUM CHLORIDE 75 MILLILITER(S): 9 INJECTION, SOLUTION INTRAVENOUS at 23:35

## 2021-03-17 RX ADMIN — Medication 15 MILLIGRAM(S): at 15:22

## 2021-03-17 RX ADMIN — Medication 975 MILLIGRAM(S): at 11:43

## 2021-03-17 RX ADMIN — HYDROMORPHONE HYDROCHLORIDE 0.5 MILLIGRAM(S): 2 INJECTION INTRAMUSCULAR; INTRAVENOUS; SUBCUTANEOUS at 03:15

## 2021-03-17 RX ADMIN — Medication 15 MILLIGRAM(S): at 21:43

## 2021-03-17 RX ADMIN — POLYETHYLENE GLYCOL 3350 17 GRAM(S): 17 POWDER, FOR SOLUTION ORAL at 11:43

## 2021-03-17 RX ADMIN — Medication 975 MILLIGRAM(S): at 17:45

## 2021-03-17 NOTE — CONSULT NOTE ADULT - PROBLEM SELECTOR RECOMMENDATION 9
Found to have left 3-7 lateral rib fx minimally displaced with L 5-7 displaced posterior rib fx.   - Multimodal pain control  - IS  - OOB

## 2021-03-17 NOTE — OCCUPATIONAL THERAPY INITIAL EVALUATION ADULT - DIAGNOSIS, OT EVAL
Patient presents with decreased strength, ROM impacting ability to perform ADLs and functional mobility

## 2021-03-17 NOTE — H&P ADULT - ASSESSMENT
***  full note to follow    Plan:  -admit to Dr. Singh  -ortho consult for L clavicle and L scapula fractures, continue with LUE sling  -appreciate SICU admission  -reg diet  -multimodal pain control  -IS (750cc on admission)  -VTE ppx    Discussed with Dr. Francisco Latham PGY2  General Surgery    ACS  [id number] 56M otherwise healthy presents as trauma transfer from [facility] three days after mechanical fall down 3 steps. Pt states he was drinking with his family on Saturday when he had tripped and fell down 3 steps onto L side, with his glasses breaking on his face (frame broke, lens intact, no shattered glass). Went to ED, had normal CT head/c-spine, ED repaired L eyebrow facial lac and sent patient home. On 3/15, patient began having L chest wall pain and L collarbone pain, presented to [facility] ED, CT chest shows acute displaced L mid clavicle fracture, L scapular fracture, and L 3-7 lateral rib fx minimally displaced with L 5-7 displaced posterior rib fx. Transferred to [facility] for trauma surgery evaluation.     Plan:  -admit to Dr. Singh  -ortho consult for L clavicle and L scapula fractures, continue with LUE sling  -appreciate SICU admission  -reg diet  -multimodal pain control  -IS (750cc on admission)  -VTE ppx    Discussed with Dr. Francisco Latham PGY2  General Surgery    ACS  [id number]

## 2021-03-17 NOTE — H&P ADULT - ATTENDING COMMENTS
Pt seen and examined. Agree with A/P. Admit to ACS, SICU. Pain control, pulmonary toilet, ortho eval, lovenox, diet.

## 2021-03-17 NOTE — CONSULT NOTE ADULT - SUBJECTIVE AND OBJECTIVE BOX
TRAUMA COMANAGEMENT MEDICINE ATTENDING INITIAL CONSULT NOTE    HPI: imaging/labs found on [facility] chart MRN: [id number]  56M otherwise healthy presents as trauma transfer from [facility] three days after mechanical fall down 3 steps. Pt states he was drinking with his family on Saturday when he had tripped and fell down 3 steps onto L side, with his glasses breaking on his face (frame broke, lens intact, no shattered glass). Went to ED, had normal CT head/c-spine, ED repaired L eyebrow facial lac and sent patient home. On 3/15, patient began having L chest wall pain and L collarbone pain, presented to [facility] ED, CT chest shows acute displaced L mid clavicle fracture, L scapular fracture, and L 3-7 lateral rib fx minimally displaced with L 5-7 displaced posterior rib fx. Transferred to [facility] for trauma surgery evaluation. Denies fevers, chills, vision changes, nausea, vomiting, chest pain, shortness of breath, dizziness, lightheadedness.       SUBJECTIVE: Seen sitting in chair. Pain controlled currently. Does have left sided rib pain when he takes a deep breath. No headache, changes in vision, N/V     Home Medications:  Pt states he takes no medications because he has no insurance:  (17 Mar 2021 10:58)      PAST MEDICAL & SURGICAL HISTORY:  No pertinent past medical history    No significant past surgical history        Review of Systems:   CONSTITUTIONAL: No fever, weight loss, or fatigue  EYES: No eye pain, visual disturbances, or discharge  ENMT:  No difficulty hearing, tinnitus, vertigo; No sinus or throat pain  NECK: No pain or stiffness  RESPIRATORY: No cough, wheezing, chills or hemoptysis; No shortness of breath. + chest wall pain  CARDIOVASCULAR: No chest pain, palpitations, dizziness, or leg swelling  GASTROINTESTINAL: No abdominal or epigastric pain. No nausea, vomiting, or hematemesis; No diarrhea or constipation. No melena or hematochezia.  GENITOURINARY: No dysuria, frequency, hematuria, or incontinence  NEUROLOGICAL: No headaches, memory loss, loss of strength, numbness, or tremors  SKIN: No itching, burning, rashes, or lesions   LYMPH NODES: No enlarged glands  MUSCULOSKELETAL: No joint pain or swelling; No muscle, back, or extremity pain  PSYCHIATRIC: No depression, anxiety, mood swings, or difficulty sleeping  HEME/LYMPH: No easy bruising, or bleeding gums      Allergies    No Known Allergies    Intolerances        Social History: Social ETOH on weekends    FAMILY HISTORY:  History of CAD and hypertension in his father    Vital Signs Last 24 Hrs  T(C): 36.9 (17 Mar 2021 11:00), Max: 36.9 (16 Mar 2021 23:27)  T(F): 98.4 (17 Mar 2021 11:00), Max: 98.5 (16 Mar 2021 23:27)  HR: 92 (17 Mar 2021 11:00) (85 - 101)  BP: 180/91 (17 Mar 2021 11:00) (141/93 - 190/109)  BP(mean): 129 (17 Mar 2021 11:00) (113 - 143)  RR: 12 (17 Mar 2021 11:00) (12 - 29)  SpO2: 99% (17 Mar 2021 11:00) (93% - 100%)  CAPILLARY BLOOD GLUCOSE          PHYSICAL EXAM:  GENERAL: NAD, well-developed. LUE in sling  HEAD:  NCAT  EYES: EOMI  NECK: Supple, No JVD  CHEST/LUNG: Clear to auscultation bilaterally; No wheeze  HEART: Reg rate. No M/R/G  ABDOMEN: Soft, NT/ND, Bowel sounds present  EXTREMITIES:  2+ Peripheral Pulses, No clubbing, cyanosis, or edema  PSYCH: AAOx3  NEUROLOGY: non-focal  SKIN: No rashes or lesions    LABS:                        14.7   10.75 )-----------( 218      ( 17 Mar 2021 05:10 )             45.1     03-17    138  |  102  |  13  ----------------------------<  95  3.7   |  24  |  0.68    Ca    8.9      17 Mar 2021 05:10  Phos  3.1     03-17  Mg     2.0     03-17      PT/INR - ( 17 Mar 2021 05:11 )   PT: 11.9 sec;   INR: 0.99 ratio         PTT - ( 17 Mar 2021 05:11 )  PTT:30.1 sec            MEDICATIONS  (STANDING):  acetaminophen   Tablet .. 975 milliGRAM(s) Oral every 6 hours  enoxaparin Injectable 40 milliGRAM(s) SubCutaneous every 24 hours  folic acid 1 milliGRAM(s) Oral daily  ketorolac   Injectable 15 milliGRAM(s) IV Push every 6 hours  multivitamin 1 Tablet(s) Oral daily  polyethylene glycol 3350 17 Gram(s) Oral daily  senna 2 Tablet(s) Oral at bedtime  thiamine 100 milliGRAM(s) Oral daily    MEDICATIONS  (PRN):  HYDROmorphone  Injectable 0.25 milliGRAM(s) IV Push every 3 hours PRN Severe Pain (7 - 10)  oxyCODONE    IR 2.5 milliGRAM(s) Oral every 4 hours PRN Mild Pain (1 - 3)    Radiology:  3/14  CT BRAIN: No acute intracranial bleeding.  CT CERVICAL SPINE: No fracture. Mild C5-C6 and C6-C7 disc degeneration.  CT FACE: Left periorbital soft tissue hematoma. Intact globes.    3/16  EXAM:  CT CHEST    PROCEDURE DATE:  Mar 16 2021   INTERPRETATION:  CLINICAL INFORMATION: Chest trauma, moderate to severe, left mid and upper chest pain.  COMPARISON: None available.    FINDINGS:    LUNGS AND AIRWAYS: Patent central airways.  Lungs are clear. No pneumothorax.  PLEURA: Small left pleural effusion.  MEDIASTINUM AND JOJO: No lymphadenopathy.  VESSELS: Aortic calcifications.  HEART: Heart size is normal. No pericardial effusion.  CHEST WALL AND LOWER NECK: Subcutaneous edema/skin thickening likely from trauma overlying the region of the left clavicle.  VISUALIZED UPPER ABDOMEN: Within normal limits.  BONES: Acute minimally  fractures of the left third through seventh left lateral ribs. Acute displaced fractures of the posterior fifth through seventh left ribs. Acute minimally displaced fracture of the left scapula. Acute comminuted fracture of the middle left clavicle.    IMPRESSION:  Acute comminuted fracture of the middle left clavicle. Acute displaced fractures of the posterior fifth through seventh left ribs and minimally displaced fractures of the left third through seventh left lateral ribs. Acute minimally displaced fracture of the left scapula.    No pneumothorax.    Small left pleural effusion.

## 2021-03-17 NOTE — H&P ADULT - NSHPLABSRESULTS_GEN_ALL_CORE
IMAGING    3/14  CT BRAIN: No acute intracranial bleeding.  CT CERVICAL SPINE: No fracture. Mild C5-C6 and C6-C7 disc degeneration.  CT FACE: Left periorbital soft tissue hematoma. Intact globes.    3/16  EXAM:  CT CHEST    PROCEDURE DATE:  Mar 16 2021   INTERPRETATION:  CLINICAL INFORMATION: Chest trauma, moderate to severe, left mid and upper chest pain.  COMPARISON: None available.    FINDINGS:    LUNGS AND AIRWAYS: Patent central airways.  Lungs are clear. No pneumothorax.  PLEURA: Small left pleural effusion.  MEDIASTINUM AND JOJO: No lymphadenopathy.  VESSELS: Aortic calcifications.  HEART: Heart size is normal. No pericardial effusion.  CHEST WALL AND LOWER NECK: Subcutaneous edema/skin thickening likely from trauma overlying the region of the left clavicle.  VISUALIZED UPPER ABDOMEN: Within normal limits.  BONES: Acute minimally  fractures of the left third through seventh left lateral ribs. Acute displaced fractures of the posterior fifth through seventh left ribs. Acute minimally displaced fracture of the left scapula. Acute comminuted fracture of the middle left clavicle.    IMPRESSION:  Acute comminuted fracture of the middle left clavicle. Acute displaced fractures of the posterior fifth through seventh left ribs and minimally displaced fractures of the left third through seventh left lateral ribs. Acute minimally displaced fracture of the left scapula.    No pneumothorax.    Small left pleural effusion.      LAUREL HILL MD; Resident Radiology  This document has been electronically signed.  DINA ZAMAN MD; Attending Radiologist  This document has been electronically signed. Mar 16 2021  8:43PM

## 2021-03-17 NOTE — PATIENT PROFILE ADULT - NSTRANSFERBELONGINGSDISPO_GEN_A_NUR
Normal vision: sees adequately in most situations; can see medication labels, newsprint
not applicable

## 2021-03-17 NOTE — PHYSICAL THERAPY INITIAL EVALUATION ADULT - PLANNED THERAPY INTERVENTIONS, PT EVAL
stair neg: GOAL: pt will neg 4 steps ind in 2wks./bed mobility training/gait training/transfer training

## 2021-03-17 NOTE — OCCUPATIONAL THERAPY INITIAL EVALUATION ADULT - LIVES WITH, PROFILE
Lives in house with sister, 4 steps to enter +bilateral handrails, first floor setup available with bed and bath

## 2021-03-17 NOTE — OCCUPATIONAL THERAPY INITIAL EVALUATION ADULT - ADL RETRAINING, OT EVAL
GOAL: Pt will perform LB dressing independently in 4 weeks GOAL: Patient will be independent with UB dressing within 4 weeks

## 2021-03-17 NOTE — OCCUPATIONAL THERAPY INITIAL EVALUATION ADULT - LEVEL OF INDEPENDENCE: DRESS LOWER BODY, OT EVAL
educated on redd technique to don/doff socks, min A to initiate sock placement/minimum assist (75% patients effort)

## 2021-03-17 NOTE — CONSULT NOTE ADULT - ASSESSMENT
ASSESSMENT:  56y Male with no significant PMH presenting after falling down the stairs on Saturday s/p repair of L eyebrow laceration presenting now with worsening L sided rib pain. Found to have L scapula, L clavicle fractures, 3-7 rib fractures     PLAN:   Neurologic:   - Multimodal pain control with tylenol, ibuprofen, oxycodone and lidocaine patch  - Patient with history of drinking, per patient not daily drinker and no drink in last 2 days    Respiratory:   - Incentive spirometer (750)  - Multimodal pain control    Cardiovascular:   - No active issues     Gastrointestinal/Nutrition:   - Regular diet    Renal/Genitourinary:   - No active issues on trauma labs at [facility]    Hematologic:   - No active issues    Infectious Disease:   - No active issues     Lines/Tubes:  - PIVs    Endocrine:   - No active issues    Disposition: SICU    --------------------------------------------------------------------------------------    Critical Care Diagnoses: ASSESSMENT:  56y Male with no significant PMH presenting after falling down the stairs on Saturday s/p repair of L eyebrow laceration presenting now with worsening L sided rib pain. Found to have L scapula, L clavicle fractures, 3-7 rib fractures     PLAN:   Neurologic:   - Multimodal pain control with tylenol, ibuprofen, oxycodone and lidocaine patch  - Patient with history of drinking, per patient not daily drinker and no drink in last 2 days    Respiratory:   - Incentive spirometer (750)  - Multimodal pain control    Cardiovascular:   - No active issues     Gastrointestinal/Nutrition:   - Regular diet    Renal/Genitourinary:   - No active issues on trauma labs at [facility]    Hematologic:   - Hemoglobin 14.8  - Lovenox for DVT ppx    Infectious Disease:   - No active issues     Lines/Tubes:  - PIVs    Endocrine:   - No active issues    Disposition: SICU    --------------------------------------------------------------------------------------    Critical Care Diagnoses:

## 2021-03-17 NOTE — ED ADULT NURSE REASSESSMENT NOTE - NS ED NURSE REASSESS COMMENT FT1
Patient repositioned in bed for comfort. Denies complaints at this time. Re educated on use of call bell and patient verbalizes understanding.

## 2021-03-17 NOTE — OCCUPATIONAL THERAPY INITIAL EVALUATION ADULT - PRECAUTIONS/LIMITATIONS, REHAB EVAL
(continued) presented to [facility] ED, CT chest shows acute displaced L mid clavicle fracture, L scapular fracture, and L 3-7 lateral rib fx minimally displaced with L 5-7 displaced posterior rib fx. Transferred to [facility] for trauma surgery evaluation./fall precautions

## 2021-03-17 NOTE — CHART NOTE - NSCHARTNOTEFT_GEN_A_CORE
Discussed with attending, Dr. Bautista. Plan for ORIF of left clavicle tomorrow 3/18/2021. Please document SICU clearance for OR tomorrow. Please draw preop labs tomorrow AM for OR. Please hold DVT ppx and make pt NPO after midnight.
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
## 2021-03-17 NOTE — CONSULT NOTE ADULT - PROBLEM/RECOMMENDATION-4
Child arrives with parents who complain of runny nose, bleeding from rt nare.  Mother reports foul smell from exudate.    DISPLAY PLAN FREE TEXT

## 2021-03-17 NOTE — ED ADULT NURSE REASSESSMENT NOTE - NS ED NURSE REASSESS COMMENT FT1
Patient provided with new incentive spirometer and educated on use. Patient demonstrated understanding of how to use incentive spirometer via teach back method.
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
## 2021-03-17 NOTE — CONSULT NOTE ADULT - PROBLEM SELECTOR RECOMMENDATION 4
Blood pressures have been significantly elevated during hospital stay. On my interview pt did not appear to be in pain thus pain not the main  for elevated BP. Suspect pt with undiagnosed hypertension. Given degree of elevation suspect that patient will likely require at least a two drug regimen to bring his pressures to goal. SBP goal <140  - For now would start Norvasc 10mg daily  - Monitor BP, will introduce further antihypertensive therapy as needed Blood pressures have been significantly elevated during hospital stay. On my interview pt did not appear to be in pain thus pain not the main  for elevated BP. Suspect pt with undiagnosed hypertension. Given degree of elevation suspect that patient will likely require at least a two drug regimen to bring his pressures to goal. SBP goal <140  - For now would start Norvasc 10mg daily  - Hydralazine PRN SBP >190  - Monitor BP, will introduce further antihypertensive therapy (Likely Lisinopril) as needed

## 2021-03-17 NOTE — ED ADULT NURSE REASSESSMENT NOTE - NS ED NURSE REASSESS COMMENT FT1
ED MD Harvey made aware of patients' BP of 176/117. Per ED MD Harvey no intervention is recommended at this time. Patient is asymptomatic related to BP, no complaints of chest pain, headache, dizziness, or SOB.

## 2021-03-17 NOTE — CONSULT NOTE ADULT - PROBLEM SELECTOR RECOMMENDATION 6
Pt has not seen a physician since coming to the  in 2000. Will need PCP on discharge. Given lack of insurance pt could follow up at the medicine clinic at [address] [place] [id number] Phone: [phone]

## 2021-03-17 NOTE — SBIRT NOTE ADULT - NSSBIRTALCPOSREINDET_GEN_A_CORE
Patient reports drinking approximately 1-2 drinks on the weekends in social gatherings. Patient denies current/previous ETOH treatment and associated his drinking to be a problem at this time. Education provided on healthy guidelines and potential negative consequences with level of risk.  Patient stated he will stop drinking now as he has high blood pressure.
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
## 2021-03-17 NOTE — OCCUPATIONAL THERAPY INITIAL EVALUATION ADULT - PERTINENT HX OF CURRENT PROBLEM, REHAB EVAL
56M trauma transfer from [facility] 3 days after fall. Pt states he was drinking when he fell down 3 steps onto L side, with his glasses breaking on his face Went to ED, had normal CT head/c-spine, ED repaired L eyebrow facial lac and sent patient home. On 3/15, pt began having L chest wall pain and L collarbone pain, (See below)

## 2021-03-17 NOTE — CONSULT NOTE ADULT - PROBLEM SELECTOR RECOMMENDATION 3
Ortho following. Plan for ORIF tomorrow  - NPO after midnight Ortho following. Plan for ORIF tomorrow. Pt with METs>4, RCRI of 0. Should BP remain controlled (SBP<180) pt is medically optimized for planned procedure tomorrow.  - NPO after midnight

## 2021-03-18 ENCOUNTER — APPOINTMENT (OUTPATIENT)
Dept: ORTHOPEDIC SURGERY | Facility: HOSPITAL | Age: 56
End: 2021-03-18
Payer: SUBSIDIZED

## 2021-03-18 DIAGNOSIS — Z29.9 ENCOUNTER FOR PROPHYLACTIC MEASURES, UNSPECIFIED: ICD-10-CM

## 2021-03-18 LAB
ANION GAP SERPL CALC-SCNC: 11 MMOL/L — SIGNIFICANT CHANGE UP (ref 5–17)
ANION GAP SERPL CALC-SCNC: 15 MMOL/L — SIGNIFICANT CHANGE UP (ref 5–17)
APTT BLD: 29.7 SEC — SIGNIFICANT CHANGE UP (ref 27.5–35.5)
BLD GP AB SCN SERPL QL: NEGATIVE — SIGNIFICANT CHANGE UP
BUN SERPL-MCNC: 12 MG/DL — SIGNIFICANT CHANGE UP (ref 7–23)
BUN SERPL-MCNC: 12 MG/DL — SIGNIFICANT CHANGE UP (ref 7–23)
CALCIUM SERPL-MCNC: 8.6 MG/DL — SIGNIFICANT CHANGE UP (ref 8.4–10.5)
CALCIUM SERPL-MCNC: 9.7 MG/DL — SIGNIFICANT CHANGE UP (ref 8.4–10.5)
CHLORIDE SERPL-SCNC: 102 MMOL/L — SIGNIFICANT CHANGE UP (ref 96–108)
CHLORIDE SERPL-SCNC: 99 MMOL/L — SIGNIFICANT CHANGE UP (ref 96–108)
CO2 SERPL-SCNC: 23 MMOL/L — SIGNIFICANT CHANGE UP (ref 22–31)
CO2 SERPL-SCNC: 25 MMOL/L — SIGNIFICANT CHANGE UP (ref 22–31)
CREAT SERPL-MCNC: 0.71 MG/DL — SIGNIFICANT CHANGE UP (ref 0.5–1.3)
CREAT SERPL-MCNC: 0.84 MG/DL — SIGNIFICANT CHANGE UP (ref 0.5–1.3)
GLUCOSE SERPL-MCNC: 127 MG/DL — HIGH (ref 70–99)
GLUCOSE SERPL-MCNC: 96 MG/DL — SIGNIFICANT CHANGE UP (ref 70–99)
HCT VFR BLD CALC: 43.4 % — SIGNIFICANT CHANGE UP (ref 39–50)
HCT VFR BLD CALC: 45.7 % — SIGNIFICANT CHANGE UP (ref 39–50)
HCV AB S/CO SERPL IA: 0.09 S/CO — SIGNIFICANT CHANGE UP (ref 0–0.99)
HCV AB SERPL-IMP: SIGNIFICANT CHANGE UP
HGB BLD-MCNC: 14.3 G/DL — SIGNIFICANT CHANGE UP (ref 13–17)
HGB BLD-MCNC: 15.2 G/DL — SIGNIFICANT CHANGE UP (ref 13–17)
INR BLD: 1 RATIO — SIGNIFICANT CHANGE UP (ref 0.88–1.16)
MAGNESIUM SERPL-MCNC: 2.1 MG/DL — SIGNIFICANT CHANGE UP (ref 1.6–2.6)
MCHC RBC-ENTMCNC: 30.2 PG — SIGNIFICANT CHANGE UP (ref 27–34)
MCHC RBC-ENTMCNC: 30.2 PG — SIGNIFICANT CHANGE UP (ref 27–34)
MCHC RBC-ENTMCNC: 32.9 GM/DL — SIGNIFICANT CHANGE UP (ref 32–36)
MCHC RBC-ENTMCNC: 33.3 GM/DL — SIGNIFICANT CHANGE UP (ref 32–36)
MCV RBC AUTO: 90.7 FL — SIGNIFICANT CHANGE UP (ref 80–100)
MCV RBC AUTO: 91.6 FL — SIGNIFICANT CHANGE UP (ref 80–100)
NRBC # BLD: 0 /100 WBCS — SIGNIFICANT CHANGE UP (ref 0–0)
NRBC # BLD: 0 /100 WBCS — SIGNIFICANT CHANGE UP (ref 0–0)
PHOSPHATE SERPL-MCNC: 3.3 MG/DL — SIGNIFICANT CHANGE UP (ref 2.5–4.5)
PLATELET # BLD AUTO: 263 K/UL — SIGNIFICANT CHANGE UP (ref 150–400)
PLATELET # BLD AUTO: 268 K/UL — SIGNIFICANT CHANGE UP (ref 150–400)
POTASSIUM SERPL-MCNC: 3.5 MMOL/L — SIGNIFICANT CHANGE UP (ref 3.5–5.3)
POTASSIUM SERPL-MCNC: 4 MMOL/L — SIGNIFICANT CHANGE UP (ref 3.5–5.3)
POTASSIUM SERPL-SCNC: 3.5 MMOL/L — SIGNIFICANT CHANGE UP (ref 3.5–5.3)
POTASSIUM SERPL-SCNC: 4 MMOL/L — SIGNIFICANT CHANGE UP (ref 3.5–5.3)
PROTHROM AB SERPL-ACNC: 12 SEC — SIGNIFICANT CHANGE UP (ref 10.6–13.6)
RBC # BLD: 4.74 M/UL — SIGNIFICANT CHANGE UP (ref 4.2–5.8)
RBC # BLD: 5.04 M/UL — SIGNIFICANT CHANGE UP (ref 4.2–5.8)
RBC # FLD: 13.7 % — SIGNIFICANT CHANGE UP (ref 10.3–14.5)
RBC # FLD: 13.9 % — SIGNIFICANT CHANGE UP (ref 10.3–14.5)
RH IG SCN BLD-IMP: POSITIVE — SIGNIFICANT CHANGE UP
SODIUM SERPL-SCNC: 137 MMOL/L — SIGNIFICANT CHANGE UP (ref 135–145)
SODIUM SERPL-SCNC: 138 MMOL/L — SIGNIFICANT CHANGE UP (ref 135–145)
WBC # BLD: 23.01 K/UL — HIGH (ref 3.8–10.5)
WBC # BLD: 9.12 K/UL — SIGNIFICANT CHANGE UP (ref 3.8–10.5)
WBC # FLD AUTO: 23.01 K/UL — HIGH (ref 3.8–10.5)
WBC # FLD AUTO: 9.12 K/UL — SIGNIFICANT CHANGE UP (ref 3.8–10.5)

## 2021-03-18 PROCEDURE — 99231 SBSQ HOSP IP/OBS SF/LOW 25: CPT

## 2021-03-18 PROCEDURE — 23515 OPTX CLAVICULAR FX W/INT FIX: CPT | Mod: LT

## 2021-03-18 PROCEDURE — 71045 X-RAY EXAM CHEST 1 VIEW: CPT | Mod: 26

## 2021-03-18 PROCEDURE — ZZZZZ: CPT

## 2021-03-18 PROCEDURE — 99232 SBSQ HOSP IP/OBS MODERATE 35: CPT

## 2021-03-18 RX ORDER — POLYETHYLENE GLYCOL 3350 17 G/17G
17 POWDER, FOR SOLUTION ORAL DAILY
Refills: 0 | Status: DISCONTINUED | OUTPATIENT
Start: 2021-03-18 | End: 2021-03-19

## 2021-03-18 RX ORDER — ACETAMINOPHEN 500 MG
975 TABLET ORAL EVERY 8 HOURS
Refills: 0 | Status: DISCONTINUED | OUTPATIENT
Start: 2021-03-18 | End: 2021-03-19

## 2021-03-18 RX ORDER — SENNA PLUS 8.6 MG/1
2 TABLET ORAL AT BEDTIME
Refills: 0 | Status: DISCONTINUED | OUTPATIENT
Start: 2021-03-18 | End: 2021-03-19

## 2021-03-18 RX ORDER — ENOXAPARIN SODIUM 100 MG/ML
40 INJECTION SUBCUTANEOUS EVERY 24 HOURS
Refills: 0 | Status: DISCONTINUED | OUTPATIENT
Start: 2021-03-18 | End: 2021-03-19

## 2021-03-18 RX ORDER — OXYCODONE HYDROCHLORIDE 5 MG/1
5 TABLET ORAL EVERY 4 HOURS
Refills: 0 | Status: DISCONTINUED | OUTPATIENT
Start: 2021-03-18 | End: 2021-03-18

## 2021-03-18 RX ORDER — FOLIC ACID 0.8 MG
1 TABLET ORAL DAILY
Refills: 0 | Status: DISCONTINUED | OUTPATIENT
Start: 2021-03-18 | End: 2021-03-19

## 2021-03-18 RX ORDER — THIAMINE MONONITRATE (VIT B1) 100 MG
100 TABLET ORAL DAILY
Refills: 0 | Status: DISCONTINUED | OUTPATIENT
Start: 2021-03-18 | End: 2021-03-19

## 2021-03-18 RX ORDER — THIAMINE MONONITRATE (VIT B1) 100 MG
100 TABLET ORAL DAILY
Refills: 0 | Status: DISCONTINUED | OUTPATIENT
Start: 2021-03-18 | End: 2021-03-18

## 2021-03-18 RX ORDER — SODIUM CHLORIDE 9 MG/ML
1000 INJECTION INTRAMUSCULAR; INTRAVENOUS; SUBCUTANEOUS
Refills: 0 | Status: DISCONTINUED | OUTPATIENT
Start: 2021-03-18 | End: 2021-03-19

## 2021-03-18 RX ORDER — AMLODIPINE BESYLATE 2.5 MG/1
10 TABLET ORAL DAILY
Refills: 0 | Status: DISCONTINUED | OUTPATIENT
Start: 2021-03-18 | End: 2021-03-19

## 2021-03-18 RX ORDER — AMLODIPINE BESYLATE 2.5 MG/1
10 TABLET ORAL DAILY
Refills: 0 | Status: DISCONTINUED | OUTPATIENT
Start: 2021-03-18 | End: 2021-03-18

## 2021-03-18 RX ORDER — CHLORHEXIDINE GLUCONATE 213 G/1000ML
1 SOLUTION TOPICAL
Refills: 0 | Status: DISCONTINUED | OUTPATIENT
Start: 2021-03-18 | End: 2021-03-18

## 2021-03-18 RX ORDER — OXYCODONE HYDROCHLORIDE 5 MG/1
5 TABLET ORAL EVERY 4 HOURS
Refills: 0 | Status: DISCONTINUED | OUTPATIENT
Start: 2021-03-18 | End: 2021-03-19

## 2021-03-18 RX ORDER — HYDROMORPHONE HYDROCHLORIDE 2 MG/ML
0.25 INJECTION INTRAMUSCULAR; INTRAVENOUS; SUBCUTANEOUS
Refills: 0 | Status: DISCONTINUED | OUTPATIENT
Start: 2021-03-18 | End: 2021-03-19

## 2021-03-18 RX ORDER — LIDOCAINE 4 G/100G
1 CREAM TOPICAL DAILY
Refills: 0 | Status: DISCONTINUED | OUTPATIENT
Start: 2021-03-18 | End: 2021-03-19

## 2021-03-18 RX ORDER — CEFAZOLIN SODIUM 1 G
2000 VIAL (EA) INJECTION EVERY 8 HOURS
Refills: 0 | Status: DISCONTINUED | OUTPATIENT
Start: 2021-03-18 | End: 2021-03-18

## 2021-03-18 RX ORDER — OXYCODONE HYDROCHLORIDE 5 MG/1
2.5 TABLET ORAL EVERY 4 HOURS
Refills: 0 | Status: DISCONTINUED | OUTPATIENT
Start: 2021-03-18 | End: 2021-03-19

## 2021-03-18 RX ORDER — ACETAMINOPHEN 500 MG
975 TABLET ORAL EVERY 6 HOURS
Refills: 0 | Status: DISCONTINUED | OUTPATIENT
Start: 2021-03-18 | End: 2021-03-18

## 2021-03-18 RX ORDER — SENNA PLUS 8.6 MG/1
2 TABLET ORAL AT BEDTIME
Refills: 0 | Status: DISCONTINUED | OUTPATIENT
Start: 2021-03-18 | End: 2021-03-18

## 2021-03-18 RX ORDER — OXYCODONE HYDROCHLORIDE 5 MG/1
2.5 TABLET ORAL EVERY 4 HOURS
Refills: 0 | Status: DISCONTINUED | OUTPATIENT
Start: 2021-03-18 | End: 2021-03-18

## 2021-03-18 RX ADMIN — Medication 15 MILLIGRAM(S): at 03:00

## 2021-03-18 RX ADMIN — Medication 15 MILLIGRAM(S): at 02:30

## 2021-03-18 RX ADMIN — Medication 975 MILLIGRAM(S): at 12:29

## 2021-03-18 RX ADMIN — OXYCODONE HYDROCHLORIDE 2.5 MILLIGRAM(S): 5 TABLET ORAL at 06:38

## 2021-03-18 RX ADMIN — Medication 1 TABLET(S): at 22:39

## 2021-03-18 RX ADMIN — Medication 975 MILLIGRAM(S): at 06:08

## 2021-03-18 RX ADMIN — POLYETHYLENE GLYCOL 3350 17 GRAM(S): 17 POWDER, FOR SOLUTION ORAL at 22:39

## 2021-03-18 RX ADMIN — AMLODIPINE BESYLATE 10 MILLIGRAM(S): 2.5 TABLET ORAL at 06:07

## 2021-03-18 RX ADMIN — OXYCODONE HYDROCHLORIDE 2.5 MILLIGRAM(S): 5 TABLET ORAL at 23:10

## 2021-03-18 RX ADMIN — OXYCODONE HYDROCHLORIDE 2.5 MILLIGRAM(S): 5 TABLET ORAL at 11:00

## 2021-03-18 RX ADMIN — OXYCODONE HYDROCHLORIDE 2.5 MILLIGRAM(S): 5 TABLET ORAL at 06:07

## 2021-03-18 RX ADMIN — OXYCODONE HYDROCHLORIDE 2.5 MILLIGRAM(S): 5 TABLET ORAL at 10:28

## 2021-03-18 RX ADMIN — Medication 100 MILLIGRAM(S): at 22:39

## 2021-03-18 RX ADMIN — SENNA PLUS 2 TABLET(S): 8.6 TABLET ORAL at 22:40

## 2021-03-18 RX ADMIN — LIDOCAINE 1 PATCH: 4 CREAM TOPICAL at 22:46

## 2021-03-18 RX ADMIN — Medication 1 DROP(S): at 23:22

## 2021-03-18 RX ADMIN — OXYCODONE HYDROCHLORIDE 2.5 MILLIGRAM(S): 5 TABLET ORAL at 22:39

## 2021-03-18 RX ADMIN — Medication 1 MILLIGRAM(S): at 22:39

## 2021-03-18 RX ADMIN — LIDOCAINE 1 PATCH: 4 CREAM TOPICAL at 12:29

## 2021-03-18 RX ADMIN — Medication 975 MILLIGRAM(S): at 06:38

## 2021-03-18 RX ADMIN — CHLORHEXIDINE GLUCONATE 1 APPLICATION(S): 213 SOLUTION TOPICAL at 12:29

## 2021-03-18 RX ADMIN — Medication 975 MILLIGRAM(S): at 00:00

## 2021-03-18 NOTE — PROGRESS NOTE ADULT - PROBLEM SELECTOR PLAN 3
Plan for ORIF today. Pt with METs>4, RCRI of 0. BP better controlled today after antihypertensive medication initiation. Pt is medically optimized for planned procedure.  - NPO after midnight.

## 2021-03-18 NOTE — PROGRESS NOTE ADULT - SUBJECTIVE AND OBJECTIVE BOX
Jordan Jo MD  Division of Hospital Medicine  Pager [phone]  If no response or off hours page: [phone]  ---------------------------------------------------------    RUBEN SIDDIQI  56y  Male      Patient is a 56y old  Male who presents with a chief complaint of fall (18 Mar 2021 06:44)      INTERVAL HPI/OVERNIGHT EVENTS:  Seen at bedside. Continues to have rib pain when he moves. Is tolerable when lying in bed      REVIEW OF SYSTEMS: 10 point ROS negative unless listed above    T(C): 36.6 (03-18-21 @ 08:32), Max: 37 (03-17-21 @ 15:00)  HR: 107 (03-18-21 @ 08:32) (82 - 107)  BP: 146/98 (03-18-21 @ 08:32) (134/97 - 187/93)  RR: 18 (03-18-21 @ 08:32) (12 - 23)  SpO2: 98% (03-18-21 @ 08:32) (95% - 99%)  Wt(kg): --Vital Signs Last 24 Hrs  T(C): 36.6 (18 Mar 2021 08:32), Max: 37 (17 Mar 2021 15:00)  T(F): 97.9 (18 Mar 2021 08:32), Max: 98.6 (17 Mar 2021 15:00)  HR: 107 (18 Mar 2021 08:32) (82 - 107)  BP: 146/98 (18 Mar 2021 08:32) (134/97 - 187/93)  BP(mean): 132 (17 Mar 2021 15:00) (129 - 132)  RR: 18 (18 Mar 2021 08:32) (12 - 23)  SpO2: 98% (18 Mar 2021 08:32) (95% - 99%)    PHYSICAL EXAM:  GENERAL: NAD, well-developed. LUE in sling  HEAD:  NCAT  EYES: EOMI  NECK: Supple, No JVD  CHEST/LUNG: Clear to auscultation bilaterally; No wheeze  HEART: Reg rate. No M/R/G  ABDOMEN: Soft, NT/ND, Bowel sounds present  EXTREMITIES:  2+ Peripheral Pulses, No clubbing, cyanosis, or edema  PSYCH: AAOx3  NEUROLOGY: non-focal  SKIN: No rashes or lesions    Consultant(s) Notes Reviewed:  [x ] YES  [ ] NO  Care Discussed with Consultants/Other Providers [ x] YES  [ ] NO    LABS:                        15.2   9.12  )-----------( 263      ( 18 Mar 2021 04:58 )             45.7     03-18    138  |  102  |  12  ----------------------------<  96  3.5   |  25  |  0.71    Ca    9.7      18 Mar 2021 04:58  Phos  3.3     03-18  Mg     2.1     03-18      PT/INR - ( 18 Mar 2021 04:58 )   PT: 12.0 sec;   INR: 1.00 ratio         PTT - ( 18 Mar 2021 04:58 )  PTT:29.7 sec    CAPILLARY BLOOD GLUCOSE                RADIOLOGY & ADDITIONAL TESTS:    Imaging Personally Reviewed:  [x ] YES  [ ] NO

## 2021-03-18 NOTE — PROGRESS NOTE ADULT - PROBLEM SELECTOR PLAN 1
Found to have left 3-7 lateral rib fx minimally displaced with L 5-7 displaced posterior rib fx.   - Multimodal pain control  - IS  - OOB.

## 2021-03-18 NOTE — CHART NOTE - NSCHARTNOTEFT_GEN_A_CORE
SICU Transfer Note    Transfer from: SICU  Transfer to: [facility]  Accepting physican: Dr. Singh    SICU COURSE:  Admitted to SICU for respiratory support/monitoring  On room air, downgraded to floor    Physical Exam:  General: NAD  Pulm: Breathing on room air  Chest: Mildly tender to palp  Abdomen: soft nontender    ASSESSMENT & PLAN:   6M otherwise healthy presents as trauma transfer from [facility] three days after mechanical fall down 3 steps. Pt states he was drinking with his family on Saturday when he had tripped and fell down 3 steps onto L side, with his glasses breaking on his face (frame broke, lens intact, no shattered glass). Went to ED, had normal CT head/c-spine, ED repaired L eyebrow facial lac and sent patient home. On 3/15, patient began having L chest wall pain and L collarbone pain, presented to [facility] ED, CT chest shows acute displaced L mid clavicle fracture, L scapular fracture, and L 3-7 lateral rib fx minimally displaced with L 5-7 displaced posterior rib fx. Transferred to [facility] for trauma surgery evaluation.     Plan:  - NPO, IVF at midnight for ortho  - Tylenol, toradol, dilaudid, lidocaine patch  - Amlodipine for BP, labetolol if persistent  -OR with Ortho tomorrow for ORIF clavicle, holding dvt ppx  - NWB sling for scapula  -multimodal pain control  -IS (750cc on admission)  -Multivitamin, thiamine, senna, polyethylene glycol  -VTE ppx      Vital Signs Last 24 Hrs  T(C): 36.6 (18 Mar 2021 00:15), Max: 37 (17 Mar 2021 15:00)  T(F): 97.8 (18 Mar 2021 00:15), Max: 98.6 (17 Mar 2021 15:00)  HR: 98 (18 Mar 2021 00:15) (82 - 101)  BP: 134/97 (18 Mar 2021 00:15) (134/97 - 190/109)  BP(mean): 132 (17 Mar 2021 15:00) (113 - 143)  RR: 18 (18 Mar 2021 00:15) (12 - 29)  SpO2: 95% (18 Mar 2021 00:15) (95% - 100%)  I&O's Summary    16 Mar 2021 07:01  -  17 Mar 2021 07:00  --------------------------------------------------------  IN: 0 mL / OUT: 200 mL / NET: -200 mL    17 Mar 2021 07:01  -  18 Mar 2021 00:45  --------------------------------------------------------  IN: 820 mL / OUT: 1700 mL / NET: -880 mL          MEDICATIONS  (STANDING):  acetaminophen   Tablet .. 975 milliGRAM(s) Oral every 6 hours  amLODIPine   Tablet 10 milliGRAM(s) Oral daily  chlorhexidine 2% Cloths 1 Application(s) Topical <User Schedule>  folic acid 1 milliGRAM(s) Oral daily  influenza   Vaccine 0.5 milliLiter(s) IntraMuscular once  ketorolac   Injectable 15 milliGRAM(s) IV Push every 6 hours  lactated ringers. 1000 milliLiter(s) (75 mL/Hr) IV Continuous <Continuous>  lidocaine   Patch 1 Patch Transdermal daily  multivitamin 1 Tablet(s) Oral daily  polyethylene glycol 3350 17 Gram(s) Oral daily  senna 2 Tablet(s) Oral at bedtime  thiamine 100 milliGRAM(s) Oral daily    MEDICATIONS  (PRN):  HYDROmorphone  Injectable 0.25 milliGRAM(s) IV Push every 3 hours PRN Severe Pain (7 - 10)  oxyCODONE    IR 2.5 milliGRAM(s) Oral every 4 hours PRN Mild Pain (1 - 3)        LABS                                            14.7                  Neurophils% (auto):   x      (03-17 @ 05:10):    10.75)-----------(218          Lymphocytes% (auto):  x                                             45.1                   Eosinphils% (auto):   x        Manual%: Neutrophils x    ; Lymphocytes x    ; Eosinophils x    ; Bands%: x    ; Blasts x                                    138    |  102    |  13                  Calcium: 8.9   / iCa: x      (03-17 @ 05:10)    ----------------------------<  95        Magnesium: 2.0                              3.7     |  24     |  0.68             Phosphorous: 3.1        ( 03-17 @ 05:11 )   PT: 11.9 sec;   INR: 0.99 ratio  aPTT: 30.1 sec

## 2021-03-18 NOTE — CHART NOTE - NSCHARTNOTEFT_GEN_A_CORE
TERTIARY TRAUMA SURVEY  ------------------------------------------------------------------------------------------    Date/Time: 03-18-21 @ 11:59  Admit date: 03/17/2021  Trauma activation: level 3 (consult)  Admit GCS:  15	E-  4	V-  5	M-  6    HPI:    GENERAL SURGERY TRAUMA SERVICE - CONSULT NOTE  --------------------------------------------------------------------------------------------    TRAUMA ACTIVATION LEVEL: 3    MECHANISM OF INJURY:      [x] Blunt:     [] Motor vehicle collision       [x] Fall       [] Pedestrian struck	      [] Motorcycle accident      [] Penetrating:     [] Gun shot wound       [] Stab wound    HPI: imaging/labs found on [facility] chart MRN: [id number]  56M otherwise healthy presents as trauma transfer from [facility] three days after mechanical fall down 3 steps. Pt states he was drinking with his family on Saturday when he had tripped and fell down 3 steps onto L side, with his glasses breaking on his face (frame broke, lens intact, no shattered glass). Went to ED, had normal CT head/c-spine, ED repaired L eyebrow facial lac and sent patient home. On 3/15, patient began having L chest wall pain and L collarbone pain, presented to [facility] ED, CT chest shows acute displaced L mid clavicle fracture, L scapular fracture, and L 3-7 lateral rib fx minimally displaced with L 5-7 displaced posterior rib fx. Transferred to [facility] for trauma surgery evaluation. Denies fevers, chills, vision changes, nausea, vomiting, chest pain, shortness of breath, dizziness, lightheadedness.      in ED    Drinks alcohol every Saturday and Sunday with family members.  Denies tobacco or drug use.    PRIMARY SURVEY:   A - airway intact  B - breathing comfortably  C - initial BP  BP: 144/103 (03-17-21 @ 01:35)  , HR HR: 93 (03-17-21 @ 01:35) , palpable pulses in all extremities  D - GCS 15 on arrival. E 4, V 5, M 6.   Exposure obtained    SECONDARY SURVEY:  General: NAD  HEENT: Normocephalic, EOMI, PEERL, CN II-XII grossly intact, L periorbital ecchymoses nontender, L lateral superior eyelid laceration s/p repair  no c-spine TTP  Neck: Soft, midline trachea  Chest: L lateral clavicle area soft tissue edema, no crepitus appreciated, L lateral chest wall TTP, no ecchymoses  Cardiac: appears well perfused  Respiratory: breathing comfortably   Abdomen: Soft, non-distended, non-tender; no rebound or guarding; no palpable masses, no surgical scars  Groin: Normal appearing  Extremities: Palpable radial & DP pulses bilaterally. Motor and sensory grossly intact in all 4 extremities.  Back: No TTP; no palpable runoff/stepoff/deformity, good rectal tone    ROS: 10-system review all negative except as noted in HPI.      PAST MEDICAL & SURGICAL HISTORY:  No pertinent past medical history    No significant past surgical history      FAMILY HISTORY:  : Non-contributory, as reviewed with the patient and family.    SOCIAL HISTORY:    Vaccinations up-to-date.     ALLERGIES: No Known Allergies      HOME MEDICATIONS:  Home Medications:  none    --------------------------------------------------------------------------------------------    VITALS:   T(C): 36.9 (03-16-21 @ 23:27), Max: 36.9 (03-16-21 @ 23:27)  HR: 93 (03-17-21 @ 01:35) (93 - 98)  BP: 144/103 (03-17-21 @ 01:35) (144/103 - 176/117)  RR: 14 (03-17-21 @ 01:35) (14 - 21)  SpO2: 97% (03-17-21 @ 01:35) (93% - 99%)    Weight (kg): 70.3 (03-16 @ 22:31)    --------------------------------------------------------------------------------------------    LABS    see [facility] chart   (17 Mar 2021 02:04)      INTERVAL EVENTS:   Hypertensive  OR with ortho for ORIF clavicle   SBIRT    PAST MEDICAL & SURGICAL HISTORY:  No pertinent past medical history  No significant past surgical history  [x] No significant past history as reviewed with the patient and family    FAMILY HISTORY:  [x] Family history not pertinent as reviewed with the patient and family    SOCIAL HISTORY:   Vaccinations up-to-date    ALLERGIES: No Known Allergies    HOME MEDICATIONS:     CURRENT MEDICATIONS:   MEDICATIONS (STANDING): acetaminophen   Tablet .. 975 milliGRAM(s) Oral every 6 hours  amLODIPine   Tablet 10 milliGRAM(s) Oral daily  folic acid 1 milliGRAM(s) Oral daily  influenza   Vaccine 0.5 milliLiter(s) IntraMuscular once  lactated ringers. 1000 milliLiter(s) IV Continuous <Continuous>  multivitamin 1 Tablet(s) Oral daily  polyethylene glycol 3350 17 Gram(s) Oral daily  senna 2 Tablet(s) Oral at bedtime  thiamine 100 milliGRAM(s) Oral daily    MEDICATIONS (PRN):HYDROmorphone  Injectable 0.25 milliGRAM(s) IV Push every 3 hours PRN Severe Pain (7 - 10)  oxyCODONE    IR 2.5 milliGRAM(s) Oral every 4 hours PRN Mild Pain (1 - 3)    ------------------------------------------------------------------------------------------  VITAL SIGNS  T(C): 36.6 (03-18-21 @ 08:32), Max: 37 (03-17-21 @ 15:00)  HR: 107 (03-18-21 @ 08:32) (82 - 107)  BP: 146/98 (03-18-21 @ 08:32) (134/97 - 187/93)  RR: 18 (03-18-21 @ 08:32) (17 - 23)  SpO2: 98% (03-18-21 @ 08:32) (95% - 99%)  CAPILLARY BLOOD GLUCOSE      INS/OUTS:    03-17 @ 07:01  -  03-18 @ 07:00  --------------------------------------------------------  IN:    Lactated Ringers: 484 mL    Oral Fluid: 820 mL  Total IN: 1304 mL    OUT:    Estimated Blood Loss (mL): 0 mL    Voided (mL): 2400 mL  Total OUT: 2400 mL    Total NET: -1096 mL      03-18 @ 07:01  -  03-18 @ 11:59  --------------------------------------------------------  IN:  Total IN: 0 mL    OUT:    Voided (mL): 400 mL  Total OUT: 400 mL    Total NET: -400 mL    Drug Dosing Weight  Height (cm): 162.6 (17 Mar 2021 02:55)  Weight (kg): 58.4 (18 Mar 2021 06:39)  BMI (kg/m2): 22.1 (18 Mar 2021 06:39)  BSA (m2): 1.62 (18 Mar 2021 06:39)    PHYSICAL EXAM:    General: NAD, Sitting in bed comfortably  HEENT: NC/AT, EOMI  Neck: Soft, supple  Cardio: RRR  Resp: Good effort  Thorax: L lateral clavicle area soft tissue edema, no crepitus appreciated, L lateral chest wall TTP, no ecchymoses  GI/Abd: Soft, NT/ND, no rebound/guarding, no masses palpated  Vascular: Extremities warm, brisk cap refill, B/l radial pulses palpable, b/l DP/PT palpable, no palpable abdominal pulsatile mass, LUE in sling  Skin: Intact, no breakdown  Lymphatic/Nodes: No palpable lymphadenopathy  Musculoskeletal: All 4 extremities moving spontaneously, no limitations  ------------------------------------------------------------------------------------------    LABS  CBC (03-18 @ 04:58)                              15.2                           9.12    )----------------(  263        --    % Neutrophils, --    % Lymphocytes, ANC: --                                  45.7    CBC (03-17 @ 05:10)                              14.7                           10.75<H>  )----------------(  218        --    % Neutrophils, --    % Lymphocytes, ANC: --                                  45.1      BMP (03-18 @ 04:58)             138     |  102     |  12    		Ca++ --      Ca 9.7                ---------------------------------( 96    		Mg 2.1                3.5     |  25      |  0.71  			Ph 3.3     BMP (03-17 @ 05:10)             138     |  102     |  13    		Ca++ --      Ca 8.9                ---------------------------------( 95    		Mg 2.0                3.7     |  24      |  0.68  			Ph 3.1         Coags (03-18 @ 04:58)  aPTT 29.7 / INR 1.00 / PT 12.0  Coags (03-17 @ 05:11)  aPTT 30.1 / INR 0.99 / PT 11.9  ------------------------------------------------------------------------------------------    RADIOLOGICAL FINDINGS REVIEW:    CXR:   Pelvis Films:   C-Spine Films:   T/L/S Spine Films:   Extremity Films:   Head CT:   C-Spine CT:   Neck CT:   Chest CT:   ABD/Pelvis CT:   Other:     List Injuries Identified to Date:    Rib fractures      List Operative and Interventional Radiological Procedures:    - ORIF L clavicle      Consults (Date):    [x] Orthopedic Surgery: NWB LAUREL in sling, consented for ORIF L clavicle      INTERPRETATION:   56M otherwise healthy presents as trauma transfer from [facility] three days after mechanical fall down 3 steps. Pt states he was drinking with his family on Saturday when he had tripped and fell down 3 steps onto L side, with his glasses breaking on his face (frame broke, lens intact, no shattered glass). Went to ED, had normal CT head/c-spine, ED repaired L eyebrow facial lac and sent patient home. On 3/15, patient began having L chest wall pain and L collarbone pain, presented to [facility] ED, CT chest shows acute displaced L mid clavicle fracture, L scapular fracture, and L 3-7 lateral rib fx minimally displaced with L 5-7 displaced posterior rib fx. Transferred to [facility] for trauma surgery evaluation.     Plan:  - All radiologic imaging reviewed  - All laboratory findings reviewed   - Tylenol, toradol, dilaudid, lidocaine patch  - Amlodipine for BP, labetolol if persistent  - OR with for ORIF clavicle, holding dvt ppx  - NWB sling for scapula  - IS (750cc on admission)  - Multivitamin, thiamine, senna, polyethylene glycol  - VTE ppx post-op    Trauma Surgery  [id number] TERTIARY TRAUMA SURVEY  ------------------------------------------------------------------------------------------    Date/Time: 03-18-21 @ 11:59  Admit date: 03/17/2021  Trauma activation: level 3 (consult)  Admit GCS:  15	E-  4	V-  5	M-  6    HPI:    GENERAL SURGERY TRAUMA SERVICE - CONSULT NOTE  --------------------------------------------------------------------------------------------    TRAUMA ACTIVATION LEVEL: 3    MECHANISM OF INJURY:      [x] Blunt:     [] Motor vehicle collision       [x] Fall       [] Pedestrian struck	      [] Motorcycle accident      [] Penetrating:     [] Gun shot wound       [] Stab wound    HPI: imaging/labs found on [facility] chart MRN: [id number]  56M otherwise healthy presents as trauma transfer from [facility] three days after mechanical fall down 3 steps. Pt states he was drinking with his family on Saturday when he had tripped and fell down 3 steps onto L side, with his glasses breaking on his face (frame broke, lens intact, no shattered glass). Went to ED, had normal CT head/c-spine, ED repaired L eyebrow facial lac and sent patient home. On 3/15, patient began having L chest wall pain and L collarbone pain, presented to [facility] ED, CT chest shows acute displaced L mid clavicle fracture, L scapular fracture, and L 3-7 lateral rib fx minimally displaced with L 5-7 displaced posterior rib fx. Transferred to [facility] for trauma surgery evaluation. Denies fevers, chills, vision changes, nausea, vomiting, chest pain, shortness of breath, dizziness, lightheadedness.      in ED    Drinks alcohol every Saturday and Sunday with family members.  Denies tobacco or drug use.    PRIMARY SURVEY:   A - airway intact  B - breathing comfortably  C - initial BP  BP: 144/103 (03-17-21 @ 01:35)  , HR HR: 93 (03-17-21 @ 01:35) , palpable pulses in all extremities  D - GCS 15 on arrival. E 4, V 5, M 6.   Exposure obtained    SECONDARY SURVEY:  General: NAD  HEENT: Normocephalic, EOMI, PEERL, CN II-XII grossly intact, L periorbital ecchymoses nontender, L lateral superior eyelid laceration s/p repair  no c-spine TTP  Neck: Soft, midline trachea  Chest: L lateral clavicle area soft tissue edema, no crepitus appreciated, L lateral chest wall TTP, no ecchymoses  Cardiac: appears well perfused  Respiratory: breathing comfortably   Abdomen: Soft, non-distended, non-tender; no rebound or guarding; no palpable masses, no surgical scars  Groin: Normal appearing  Extremities: Palpable radial & DP pulses bilaterally. Motor and sensory grossly intact in all 4 extremities.  Back: No TTP; no palpable runoff/stepoff/deformity, good rectal tone    ROS: 10-system review all negative except as noted in HPI.      PAST MEDICAL & SURGICAL HISTORY:  No pertinent past medical history    No significant past surgical history      FAMILY HISTORY:  : Non-contributory, as reviewed with the patient and family.    SOCIAL HISTORY:    Vaccinations up-to-date.     ALLERGIES: No Known Allergies      HOME MEDICATIONS:  Home Medications:  none    --------------------------------------------------------------------------------------------    VITALS:   T(C): 36.9 (03-16-21 @ 23:27), Max: 36.9 (03-16-21 @ 23:27)  HR: 93 (03-17-21 @ 01:35) (93 - 98)  BP: 144/103 (03-17-21 @ 01:35) (144/103 - 176/117)  RR: 14 (03-17-21 @ 01:35) (14 - 21)  SpO2: 97% (03-17-21 @ 01:35) (93% - 99%)    Weight (kg): 70.3 (03-16 @ 22:31)    --------------------------------------------------------------------------------------------    LABS    see [facility] chart   (17 Mar 2021 02:04)      INTERVAL EVENTS:   Hypertensive  OR with ortho for ORIF clavicle   SBIRT    PAST MEDICAL & SURGICAL HISTORY:  No pertinent past medical history  No significant past surgical history  [x] No significant past history as reviewed with the patient and family    FAMILY HISTORY:  [x] Family history not pertinent as reviewed with the patient and family    SOCIAL HISTORY:   Vaccinations up-to-date    ALLERGIES: No Known Allergies    HOME MEDICATIONS:     CURRENT MEDICATIONS:   MEDICATIONS (STANDING): acetaminophen   Tablet .. 975 milliGRAM(s) Oral every 6 hours  amLODIPine   Tablet 10 milliGRAM(s) Oral daily  folic acid 1 milliGRAM(s) Oral daily  influenza   Vaccine 0.5 milliLiter(s) IntraMuscular once  lactated ringers. 1000 milliLiter(s) IV Continuous <Continuous>  multivitamin 1 Tablet(s) Oral daily  polyethylene glycol 3350 17 Gram(s) Oral daily  senna 2 Tablet(s) Oral at bedtime  thiamine 100 milliGRAM(s) Oral daily    MEDICATIONS (PRN):HYDROmorphone  Injectable 0.25 milliGRAM(s) IV Push every 3 hours PRN Severe Pain (7 - 10)  oxyCODONE    IR 2.5 milliGRAM(s) Oral every 4 hours PRN Mild Pain (1 - 3)    ------------------------------------------------------------------------------------------  VITAL SIGNS  T(C): 36.6 (03-18-21 @ 08:32), Max: 37 (03-17-21 @ 15:00)  HR: 107 (03-18-21 @ 08:32) (82 - 107)  BP: 146/98 (03-18-21 @ 08:32) (134/97 - 187/93)  RR: 18 (03-18-21 @ 08:32) (17 - 23)  SpO2: 98% (03-18-21 @ 08:32) (95% - 99%)  CAPILLARY BLOOD GLUCOSE      INS/OUTS:    03-17 @ 07:01  -  03-18 @ 07:00  --------------------------------------------------------  IN:    Lactated Ringers: 484 mL    Oral Fluid: 820 mL  Total IN: 1304 mL    OUT:    Estimated Blood Loss (mL): 0 mL    Voided (mL): 2400 mL  Total OUT: 2400 mL    Total NET: -1096 mL      03-18 @ 07:01  -  03-18 @ 11:59  --------------------------------------------------------  IN:  Total IN: 0 mL    OUT:    Voided (mL): 400 mL  Total OUT: 400 mL    Total NET: -400 mL    Drug Dosing Weight  Height (cm): 162.6 (17 Mar 2021 02:55)  Weight (kg): 58.4 (18 Mar 2021 06:39)  BMI (kg/m2): 22.1 (18 Mar 2021 06:39)  BSA (m2): 1.62 (18 Mar 2021 06:39)    PHYSICAL EXAM:    General: NAD, Sitting in bed comfortably  HEENT: NC/AT, EOMI  Neck: Soft, supple  Cardio: RRR  Resp: Good effort  Thorax: L lateral clavicle area soft tissue edema, no crepitus appreciated, L lateral chest wall TTP, no ecchymoses  GI/Abd: Soft, NT/ND, no rebound/guarding, no masses palpated  Vascular: Extremities warm, brisk cap refill, B/l radial pulses palpable, b/l DP/PT palpable, no palpable abdominal pulsatile mass, LUE in sling  Skin: Intact, no breakdown  Lymphatic/Nodes: No palpable lymphadenopathy  Musculoskeletal: All 4 extremities moving spontaneously, no limitations  ------------------------------------------------------------------------------------------    LABS  CBC (03-18 @ 04:58)                              15.2                           9.12    )----------------(  263        --    % Neutrophils, --    % Lymphocytes, ANC: --                                  45.7    CBC (03-17 @ 05:10)                              14.7                           10.75<H>  )----------------(  218        --    % Neutrophils, --    % Lymphocytes, ANC: --                                  45.1      BMP (03-18 @ 04:58)             138     |  102     |  12    		Ca++ --      Ca 9.7                ---------------------------------( 96    		Mg 2.1                3.5     |  25      |  0.71  			Ph 3.3     BMP (03-17 @ 05:10)             138     |  102     |  13    		Ca++ --      Ca 8.9                ---------------------------------( 95    		Mg 2.0                3.7     |  24      |  0.68  			Ph 3.1         Coags (03-18 @ 04:58)  aPTT 29.7 / INR 1.00 / PT 12.0  Coags (03-17 @ 05:11)  aPTT 30.1 / INR 0.99 / PT 11.9  ------------------------------------------------------------------------------------------    RADIOLOGICAL FINDINGS REVIEW:    CXR:   EXAM:  XR CHEST AP OR PA 1V    PROCEDURE DATE:  Mar 16 2021   INTERPRETATION:  CLINICAL INDICATION: Chest pain  TECHNIQUE: Portable frontal view of the chest.  COMPARISON: None.  FINDINGS:  Patient is rotated into an DARCY projection.  Cardiac silhouette is within normal limits.  Lungs are clear.  No pleural effusions or pneumothorax.  No acute osseous abnormality.  IMPRESSION:  Clear lungs.    Extremity Films:   EXAM:  RAD SHOULDER LT    PROCEDURE DATE:  Mar 16 2021   INTERPRETATION:  TIME OF EXAM: March 16, 2021 at 5:21 PM  CLINICAL INFORMATION: Injury to left shoulder and clavicle  TECHNIQUE:   2 views of the left clavicle and 3 views of the left shoulder.  INTERPRETATION:  There is a comminuted fracture of the middle third of the left clavicle with inferior displacement of the distal fracture fragment. No dislocations. Visualized left lung and shoulder are normal.  COMPARISON:  None available  IMPRESSION:  Comminuted left clavicular fracture    Head/C-spine/Max/face CT:   EXAM:  CT MAXILLOFACIAL    EXAM:  CT CERVICAL SPINE    EXAM:  CT BRAIN    PROCEDURE DATE:  Mar 14 2021   INTERPRETATION:  HISTORY: Trauma. Intoxication.  COMPARISON: None  TECHNIQUE: Axial noncontrast CT images of the head, cervical spine, and face were obtained and submitted for interpretation. Sagittal and coronal reformatted images were provided. Bone and soft tissue windows were evaluated.  FINDINGS:  CT BRAIN:  There is no acute intracranial mass-effect, hemorrhage, midline shift, or abnormal extra-axial fluid collection.  Age related volume loss noted. Ventricles, sulci, and cisterns are normal in size for the patient's age without evidence of hydrocephalus. Basal cisterns are patent.  The mastoid air cells are clear. Mild paranasal sinus mucosal thickening. The calvarium is intact.  CT CERVICAL SPINE:  Straightening of the cervical lordosis due to muscle spasm and/or positioning. There is no prevertebral soft tissue swelling. Vertebral body heights and alignment are maintained without compression deformity or subluxation.  C5-C6 disc degeneration loss of disc height and disc osteophyte complex formation is noted.  The atlantodental and atlanto-occipital joints are maintained. Articular facets and posterior elements alignment is maintained.  The partially imaged lung apices are clear.  CT FACE  Left periorbital soft tissue swelling and hematoma is present. There is no facial or orbital fracture. The globes are intact without retrobulbar hematoma. The lenses are located bilaterally.  The mandibular condyles are located without fracture. The temporomandibular joints are intact.  Bilateral sphenoid sinus polyps and retention cysts are noted. Remaining paranasal sinuses are clear. The nasopharyngeal contours are unremarkable.  IMPRESSION:  CT BRAIN: No acute intracranial bleeding.  CT CERVICAL SPINE: No fracture. Mild C5-C6 and C6-C7 disc degeneration.  CT FACE: Left periorbital soft tissue hematoma. Intact globes.    Chest CT:   EXAM:  CT CHEST    PROCEDURE DATE:  Mar 16 2021   INTERPRETATION:  CLINICAL INFORMATION: Chest trauma, moderate to severe, left mid and upper chest pain.  COMPARISON: None available.  PROCEDURE:  CT of the Chest was performed.  Sagittal and coronal reformats were performed.  FINDINGS:  LUNGS AND AIRWAYS: Patent central airways.  Lungs are clear. No pneumothorax.  PLEURA: Small left pleural effusion.  MEDIASTINUM AND JOJO: No lymphadenopathy.  VESSELS: Aortic calcifications.  HEART: Heart size is normal. No pericardial effusion.  CHEST WALL AND LOWER NECK: Subcutaneous edema/skin thickening likely from trauma overlying the region of the left clavicle.  VISUALIZED UPPER ABDOMEN: Within normal limits.  BONES: Acute minimally  fractures of the left third through seventh left lateral ribs. Acute displaced fractures of the posterior fifth through seventh left ribs. Acute minimally displaced fracture of the left scapula. Acute comminuted fracture of the middle left clavicle.  IMPRESSION:  Acute comminuted fracture of the middle left clavicle. Acute displaced fractures of the posterior fifth through seventh left ribs and minimally displaced fractures of the left third through seventh left lateral ribs. Acute minimally displaced fracture of the left scapula.  No pneumothorax.  Small left pleural effusion.    List Injuries Identified to Date:    Acute comminuted fracture of the middle left clavicle.   Acute displaced fractures of the posterior fifth through seventh left ribs and minimally displaced fractures of the left third through seventh left lateral ribs.   Acute minimally displaced fracture of the left scapula.  Small left pleural effusion.    List Operative and Interventional Radiological Procedures:    - ORIF L clavicle      Consults (Date):    [x] Orthopedic Surgery: ARTHUR BARRAGAN in sling, consented for ORIF L clavicle      INTERPRETATION:   56M otherwise healthy presents as trauma transfer from [facility] three days after mechanical fall down 3 steps. Pt states he was drinking with his family on Saturday when he had tripped and fell down 3 steps onto L side, with his glasses breaking on his face (frame broke, lens intact, no shattered glass). Went to ED, had normal CT head/c-spine, ED repaired L eyebrow facial lac and sent patient home. On 3/15, patient began having L chest wall pain and L collarbone pain, presented to [facility] ED, CT chest shows acute displaced L mid clavicle fracture, L scapular fracture, and L 3-7 lateral rib fx minimally displaced with L 5-7 displaced posterior rib fx. Transferred to [facility] for trauma surgery evaluation.     Plan:  - All radiologic imaging reviewed  - All laboratory findings reviewed   - Tylenol, toradol, dilaudid, lidocaine patch  - Amlodipine for BP, labetolol if persistent  - OR with for ORIF clavicle, holding dvt ppx  - NWB sling for scapula  - IS (750cc on admission)  - Multivitamin, thiamine, senna, polyethylene glycol  - VTE ppx post-op    Trauma Surgery  [id number] TERTIARY TRAUMA SURVEY  ------------------------------------------------------------------------------------------    Date/Time: 03-18-21 @ 11:59  Admit date: 03/17/2021  Trauma activation: level 3 (consult)  Admit GCS:  15	E-  4	V-  5	M-  6    HPI:    GENERAL SURGERY TRAUMA SERVICE - CONSULT NOTE  --------------------------------------------------------------------------------------------    TRAUMA ACTIVATION LEVEL: 3    MECHANISM OF INJURY:      [x] Blunt:     [] Motor vehicle collision       [x] Fall       [] Pedestrian struck	      [] Motorcycle accident      [] Penetrating:     [] Gun shot wound       [] Stab wound    HPI: imaging/labs found on [facility] chart MRN: [id number]  56M otherwise healthy presents as trauma transfer from [facility] three days after mechanical fall down 3 steps. Pt states he was drinking with his family on Saturday when he had tripped and fell down 3 steps onto L side, with his glasses breaking on his face (frame broke, lens intact, no shattered glass). Went to ED, had normal CT head/c-spine, ED repaired L eyebrow facial lac and sent patient home. On 3/15, patient began having L chest wall pain and L collarbone pain, presented to [facility] ED, CT chest shows acute displaced L mid clavicle fracture, L scapular fracture, and L 3-7 lateral rib fx minimally displaced with L 5-7 displaced posterior rib fx. Transferred to [facility] for trauma surgery evaluation. Denies fevers, chills, vision changes, nausea, vomiting, chest pain, shortness of breath, dizziness, lightheadedness.      in ED    Drinks alcohol every Saturday and Sunday with family members.  Denies tobacco or drug use.    PRIMARY SURVEY:   A -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

## 2021-03-18 NOTE — PROGRESS NOTE ADULT - SUBJECTIVE AND OBJECTIVE BOX
ORTHO ATTENDING POST OP    s/p ORIF L  clavicle  NWB  L UE  sling  sling may be removed PRN  ROM as tolerated   LUE  aquacel  shower OK  PT/OT- pendulum/codman/wall climbing  ancef x 24h  venodynes  lovenox -> dc on ASA  OOB in AM  CBC in RR and AM

## 2021-03-18 NOTE — PRE-ANESTHESIA EVALUATION ADULT - NSANTHPMHFT_GEN_ALL_CORE
undiagnosed HTN, recently started on antihypertensive  s/p fall with -CTH, left eye bruise, left rib fracture 3-7, left scapula fracture and left clavicle fracrture  denies GERD  Sedentary life style.  Has never been to PCP, never had blood work prior to this hospitalization

## 2021-03-18 NOTE — PROGRESS NOTE ADULT - ASSESSMENT
This is a 57 y/o male with no significant past medical history who presented after a fall found to have acute displaced L mid clavicle fracture, L scapular fracture, and L 3-7 lateral rib fx minimally displaced with L 5-7 displaced posterior rib fx
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
## 2021-03-18 NOTE — PROGRESS NOTE ADULT - PROBLEM SELECTOR PLAN 6
Pt has not seen a physician since coming to the  in 2000. Will need PCP on discharge. Given lack of insurance pt could follow up at the medicine clinic at [address] [place] [id number] Phone: [phone].

## 2021-03-18 NOTE — PROGRESS NOTE ADULT - PROBLEM SELECTOR PLAN 5
WBC minorly elevated. No signs /symptoms of infection. Suspect 2/2 acute fractures. Resolved  - Monitor CBC.

## 2021-03-18 NOTE — PROGRESS NOTE ADULT - PROBLEM SELECTOR PLAN 4
Blood pressures were significantly elevated initially during hospital stay. Suspect pt with undiagnosed hypertension. Given degree of elevation suspect that patient will likely require at least a two drug regimen to bring his pressures to goal. SBP goal <140. BP's now better since initiation of Norvasc  - c/w Norvasc 10mg daily. Will take 2 weeks to achieve peak effect  - Hydralazine PRN SBP >190  - Monitor BP, will introduce further antihypertensive therapy (Likely Lisinopril) as needed.
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
## 2021-03-19 ENCOUNTER — TRANSCRIPTION ENCOUNTER (OUTPATIENT)
Age: 56
End: 2021-03-19

## 2021-03-19 VITALS
SYSTOLIC BLOOD PRESSURE: 164 MMHG | HEART RATE: 102 BPM | RESPIRATION RATE: 16 BRPM | OXYGEN SATURATION: 96 % | DIASTOLIC BLOOD PRESSURE: 79 MMHG | TEMPERATURE: 99 F

## 2021-03-19 DIAGNOSIS — H11.433 CONJUNCTIVAL HYPEREMIA, BILATERAL: ICD-10-CM

## 2021-03-19 DIAGNOSIS — I10 ESSENTIAL (PRIMARY) HYPERTENSION: ICD-10-CM

## 2021-03-19 LAB
ANION GAP SERPL CALC-SCNC: 13 MMOL/L — SIGNIFICANT CHANGE UP (ref 5–17)
BUN SERPL-MCNC: 14 MG/DL — SIGNIFICANT CHANGE UP (ref 7–23)
CALCIUM SERPL-MCNC: 9.5 MG/DL — SIGNIFICANT CHANGE UP (ref 8.4–10.5)
CHLORIDE SERPL-SCNC: 99 MMOL/L — SIGNIFICANT CHANGE UP (ref 96–108)
CO2 SERPL-SCNC: 24 MMOL/L — SIGNIFICANT CHANGE UP (ref 22–31)
CREAT SERPL-MCNC: 0.81 MG/DL — SIGNIFICANT CHANGE UP (ref 0.5–1.3)
GLUCOSE SERPL-MCNC: 104 MG/DL — HIGH (ref 70–99)
HAV IGM SER-ACNC: SIGNIFICANT CHANGE UP
HBV CORE IGM SER-ACNC: SIGNIFICANT CHANGE UP
HBV SURFACE AG SER-ACNC: SIGNIFICANT CHANGE UP
HCT VFR BLD CALC: 44.4 % — SIGNIFICANT CHANGE UP (ref 39–50)
HCV AB S/CO SERPL IA: 0.07 S/CO — SIGNIFICANT CHANGE UP (ref 0–0.99)
HCV AB SERPL-IMP: SIGNIFICANT CHANGE UP
HGB BLD-MCNC: 14.4 G/DL — SIGNIFICANT CHANGE UP (ref 13–17)
HIV 1+2 AB+HIV1 P24 AG SERPL QL IA: SIGNIFICANT CHANGE UP
MAGNESIUM SERPL-MCNC: 2.2 MG/DL — SIGNIFICANT CHANGE UP (ref 1.6–2.6)
MCHC RBC-ENTMCNC: 29.6 PG — SIGNIFICANT CHANGE UP (ref 27–34)
MCHC RBC-ENTMCNC: 32.4 GM/DL — SIGNIFICANT CHANGE UP (ref 32–36)
MCV RBC AUTO: 91.4 FL — SIGNIFICANT CHANGE UP (ref 80–100)
NRBC # BLD: 0 /100 WBCS — SIGNIFICANT CHANGE UP (ref 0–0)
PHOSPHATE SERPL-MCNC: 3.9 MG/DL — SIGNIFICANT CHANGE UP (ref 2.5–4.5)
PLATELET # BLD AUTO: 246 K/UL — SIGNIFICANT CHANGE UP (ref 150–400)
POTASSIUM SERPL-MCNC: 4 MMOL/L — SIGNIFICANT CHANGE UP (ref 3.5–5.3)
POTASSIUM SERPL-SCNC: 4 MMOL/L — SIGNIFICANT CHANGE UP (ref 3.5–5.3)
RBC # BLD: 4.86 M/UL — SIGNIFICANT CHANGE UP (ref 4.2–5.8)
RBC # FLD: 13.8 % — SIGNIFICANT CHANGE UP (ref 10.3–14.5)
SODIUM SERPL-SCNC: 136 MMOL/L — SIGNIFICANT CHANGE UP (ref 135–145)
WBC # BLD: 13.35 K/UL — HIGH (ref 3.8–10.5)
WBC # FLD AUTO: 13.35 K/UL — HIGH (ref 3.8–10.5)

## 2021-03-19 PROCEDURE — 76000 FLUOROSCOPY <1 HR PHYS/QHP: CPT

## 2021-03-19 PROCEDURE — 80074 ACUTE HEPATITIS PANEL: CPT

## 2021-03-19 PROCEDURE — 97165 OT EVAL LOW COMPLEX 30 MIN: CPT

## 2021-03-19 PROCEDURE — 99232 SBSQ HOSP IP/OBS MODERATE 35: CPT

## 2021-03-19 PROCEDURE — 97168 OT RE-EVAL EST PLAN CARE: CPT

## 2021-03-19 PROCEDURE — 80048 BASIC METABOLIC PNL TOTAL CA: CPT

## 2021-03-19 PROCEDURE — 86769 SARS-COV-2 COVID-19 ANTIBODY: CPT

## 2021-03-19 PROCEDURE — 86901 BLOOD TYPING SEROLOGIC RH(D): CPT

## 2021-03-19 PROCEDURE — 99252 IP/OBS CONSLTJ NEW/EST SF 35: CPT

## 2021-03-19 PROCEDURE — 97164 PT RE-EVAL EST PLAN CARE: CPT

## 2021-03-19 PROCEDURE — 86803 HEPATITIS C AB TEST: CPT

## 2021-03-19 PROCEDURE — 86900 BLOOD TYPING SEROLOGIC ABO: CPT

## 2021-03-19 PROCEDURE — 99285 EMERGENCY DEPT VISIT HI MDM: CPT | Mod: 25

## 2021-03-19 PROCEDURE — 71045 X-RAY EXAM CHEST 1 VIEW: CPT

## 2021-03-19 PROCEDURE — U0003: CPT

## 2021-03-19 PROCEDURE — 84100 ASSAY OF PHOSPHORUS: CPT

## 2021-03-19 PROCEDURE — 86850 RBC ANTIBODY SCREEN: CPT

## 2021-03-19 PROCEDURE — 83735 ASSAY OF MAGNESIUM: CPT

## 2021-03-19 PROCEDURE — U0005: CPT

## 2021-03-19 PROCEDURE — 87389 HIV-1 AG W/HIV-1&-2 AB AG IA: CPT

## 2021-03-19 PROCEDURE — 85610 PROTHROMBIN TIME: CPT

## 2021-03-19 PROCEDURE — 99231 SBSQ HOSP IP/OBS SF/LOW 25: CPT

## 2021-03-19 PROCEDURE — C1889: CPT

## 2021-03-19 PROCEDURE — 97161 PT EVAL LOW COMPLEX 20 MIN: CPT

## 2021-03-19 PROCEDURE — 85730 THROMBOPLASTIN TIME PARTIAL: CPT

## 2021-03-19 PROCEDURE — C1713: CPT

## 2021-03-19 PROCEDURE — 85027 COMPLETE CBC AUTOMATED: CPT

## 2021-03-19 RX ORDER — POLYETHYLENE GLYCOL 3350 17 G/17G
17 POWDER, FOR SOLUTION ORAL
Qty: 0 | Refills: 0 | DISCHARGE
Start: 2021-03-19

## 2021-03-19 RX ORDER — AMLODIPINE BESYLATE 2.5 MG/1
1 TABLET ORAL
Qty: 30 | Refills: 0
Start: 2021-03-19 | End: 2021-04-17

## 2021-03-19 RX ORDER — OXYCODONE HYDROCHLORIDE 5 MG/1
1 TABLET ORAL
Qty: 12 | Refills: 0
Start: 2021-03-19 | End: 2021-03-22

## 2021-03-19 RX ORDER — ACETAMINOPHEN 500 MG
3 TABLET ORAL
Qty: 0 | Refills: 0 | DISCHARGE
Start: 2021-03-19

## 2021-03-19 RX ADMIN — POLYETHYLENE GLYCOL 3350 17 GRAM(S): 17 POWDER, FOR SOLUTION ORAL at 12:48

## 2021-03-19 RX ADMIN — Medication 1 TABLET(S): at 12:48

## 2021-03-19 RX ADMIN — SODIUM CHLORIDE 100 MILLILITER(S): 9 INJECTION INTRAMUSCULAR; INTRAVENOUS; SUBCUTANEOUS at 06:32

## 2021-03-19 RX ADMIN — Medication 1 MILLIGRAM(S): at 12:48

## 2021-03-19 RX ADMIN — LIDOCAINE 1 PATCH: 4 CREAM TOPICAL at 08:44

## 2021-03-19 RX ADMIN — Medication 100 MILLIGRAM(S): at 12:48

## 2021-03-19 RX ADMIN — LIDOCAINE 1 PATCH: 4 CREAM TOPICAL at 12:48

## 2021-03-19 RX ADMIN — AMLODIPINE BESYLATE 10 MILLIGRAM(S): 2.5 TABLET ORAL at 05:54

## 2021-03-19 RX ADMIN — Medication 1 DROP(S): at 05:55

## 2021-03-19 RX ADMIN — ENOXAPARIN SODIUM 40 MILLIGRAM(S): 100 INJECTION SUBCUTANEOUS at 12:47

## 2021-03-19 NOTE — PROGRESS NOTE ADULT - PROBLEM SELECTOR PLAN 1
Found to have left 3-7 lateral rib fx minimally displaced with L 5-7 displaced posterior rib fx. Pain controlled  - Multimodal pain control  - IS  - OOB.

## 2021-03-19 NOTE — DISCHARGE NOTE NURSING/CASE MANAGEMENT/SOCIAL WORK - PATIENT PORTAL LINK FT
You can access the FollowMyHealth Patient Portal offered by [facility] by registering at the following website: [email or web address]. By joining [facility]’s FollowMyHealth portal, you will also be able to view your health information using other applications (apps) compatible with our system.

## 2021-03-19 NOTE — PROGRESS NOTE ADULT - SUBJECTIVE AND OBJECTIVE BOX
Jordan Jo MD  Division of Hospital Medicine  Pager [phone]  If no response or off hours page: [phone]  ---------------------------------------------------------    RUBEN SIDDIQI  56y  Male      Patient is a 56y old  Male who presents with a chief complaint of fall (19 Mar 2021 06:34)      INTERVAL HPI/OVERNIGHT EVENTS:  Seen at bedside. s/o OR with ortho yesterday. Tolerated procedure well.       REVIEW OF SYSTEMS: 10 point ROS negative unless listed above    T(C): 36.7 (03-19-21 @ 08:37), Max: 37.3 (03-19-21 @ 05:35)  HR: 77 (03-19-21 @ 08:37) (72 - 101)  BP: 138/80 (03-19-21 @ 08:37) (131/87 - 153/98)  RR: 16 (03-19-21 @ 08:37) (14 - 18)  SpO2: 93% (03-19-21 @ 08:37) (93% - 99%)  Wt(kg): --Vital Signs Last 24 Hrs  T(C): 36.7 (19 Mar 2021 08:37), Max: 37.3 (19 Mar 2021 05:35)  T(F): 98 (19 Mar 2021 08:37), Max: 99.1 (19 Mar 2021 05:35)  HR: 77 (19 Mar 2021 08:37) (72 - 101)  BP: 138/80 (19 Mar 2021 08:37) (131/87 - 153/98)  BP(mean): 109 (18 Mar 2021 19:15) (102 - 115)  RR: 16 (19 Mar 2021 08:37) (14 - 18)  SpO2: 93% (19 Mar 2021 08:37) (93% - 99%)    PHYSICAL EXAM:  GENERAL: NAD, well-developed. LUE in sling  HEAD:  NCAT  EYES: Conjunctival injection  NECK: Supple, No JVD  CHEST/LUNG: Clear to auscultation bilaterally; No wheeze  HEART: Reg rate. No M/R/G  ABDOMEN: Soft, NT/ND, Bowel sounds present  EXTREMITIES:  2+ Peripheral Pulses, No clubbing, cyanosis, or edema  PSYCH: AAOx3  NEUROLOGY: non-focal  SKIN: No rashes or lesions      Consultant(s) Notes Reviewed:  [x ] YES  [ ] NO  Care Discussed with Consultants/Other Providers [ x] YES  [ ] NO    LABS:                        14.4   13.35 )-----------( 246      ( 19 Mar 2021 06:31 )             44.4     03-19    136  |  99  |  14  ----------------------------<  104<H>  4.0   |  24  |  0.81    Ca    9.5      19 Mar 2021 06:31  Phos  3.9     03-19  Mg     2.2     03-19      PT/INR - ( 18 Mar 2021 04:58 )   PT: 12.0 sec;   INR: 1.00 ratio         PTT - ( 18 Mar 2021 04:58 )  PTT:29.7 sec    CAPILLARY BLOOD GLUCOSE                RADIOLOGY & ADDITIONAL TESTS:    Imaging Personally Reviewed:  [x ] YES  [ ] NO

## 2021-03-19 NOTE — DISCHARGE NOTE PROVIDER - HOSPITAL COURSE
56M otherwise healthy presents as trauma transfer from [facility] three days after mechanical fall down 3 steps. Pt states he was drinking with his family on Saturday when he had tripped and fell down 3 steps onto L side, with his glasses breaking on his face (frame broke, lens intact, no shattered glass). Went to ED, had normal CT head/c-spine, ED repaired L eyebrow facial lac and sent patient home. On 3/15, patient began having L chest wall pain and L collarbone pain, presented to [facility] ED, CT chest shows acute displaced L mid clavicle fracture, L scapular fracture, and L 3-7 lateral rib fx minimally displaced with L 5-7 displaced posterior rib fx. Transferred to [facility] for trauma surgery evaluation. Denies fevers, chills, vision changes, nausea, vomiting, chest pain, shortness of breath, dizziness, lightheadedness. Airway was in tact, pt was breathing comfortable with GCS 15. Pt was admitted to trauma surgery. Pt was started on multimodal pain control with tylenol, ibuprofen, oxycodone and lidocaine patch. Ortho performed ORIF of left clavicle on 3/18. The patient tolerated the procedure well, there were no complications. The patient was extubated in the OR, transferred to the PACU in stable condition and then transferred to a surgical floor. Pt was instructed to wear a sling, with removal as needed. Physical therapy and OT worked with pt, recommend discharge with outpatient PT and OT.   At the time of discharge, the patient was hemodynamically stable, tolerating PO diet, voiding urine, passing stool, ambulating and was comfortable with adequate pain control. The patient was instructed to follow up with Dr. Bautista within 1-2 weeks after discharge. The patient felt comfortable with discharge. The patient was discharged to home. The patient had no other issues.    56M otherwise healthy presents as trauma transfer from [facility] three days after mechanical fall down 3 steps. Pt states he was drinking with his family on Saturday when he had tripped and fell down 3 steps onto L side, with his glasses breaking on his face (frame broke, lens intact, no shattered glass). Went to ED, had normal CT head/c-spine, ED repaired L eyebrow facial lac and sent patient home. On 3/15, patient began having L chest wall pain and L collarbone pain, presented to [facility] ED, CT chest shows acute displaced L mid clavicle fracture, L scapular fracture, and L 3-7 lateral rib fx minimally displaced with L 5-7 displaced posterior rib fx. Transferred to [facility] for trauma surgery evaluation. Denies fevers, chills, vision changes, nausea, vomiting, chest pain, shortness of breath, dizziness, lightheadedness. Airway was in tact, pt was breathing comfortable with GCS 15. Pt was admitted to trauma surgery. Pt was started on multimodal pain control with tylenol, ibuprofen, oxycodone and lidocaine patch. Ortho performed ORIF of left clavicle on 3/18. The patient tolerated the procedure well, there were no complications. The patient was extubated in the OR, transferred to the PACU in stable condition and then transferred to a surgical floor. Pt was instructed to wear a sling, with removal as needed. Physical therapy and OT worked with pt, recommend discharge with outpatient PT and OT. Patient was evaluated by hospitalist for medical co-mgmt and was started on amlodipine 10mg daily for BP control. Pt c/o watery eye discharge, seen by optho, rec artificial tears q2h, lacrilube QHS and outpatient follow up.  At the time of discharge, the patient was hemodynamically stable, tolerating PO diet, voiding urine, passing stool, ambulating and was comfortable with adequate pain control. The patient was instructed to follow up with Dr. Bautista within 1-2 weeks after discharge. The patient felt comfortable with discharge. The patient was discharged to home. The patient had no other issues.    56M otherwise healthy presents as trauma transfer from [facility] three days after mechanical fall down 3 steps. Pt states he was drinking with his family on Saturday when he had tripped and fell down 3 steps onto L side, with his glasses breaking on his face (frame broke, lens intact, no shattered glass). Went to ED, had normal CT head/c-spine, ED repaired L eyebrow facial lac and sent patient home. On 3/15, patient began having L chest wall pain and L collarbone pain, presented to [facility] ED, CT chest shows acute displaced L mid clavicle fracture, L scapular fracture, and L 3-7 lateral rib fx minimally displaced with L 5-7 displaced posterior rib fx. Transferred to [facility] for trauma surgery evaluation. Denies fevers, chills, vision changes, nausea, vomiting, chest pain, shortness of breath, dizziness, lightheadedness. Airway was in tact, pt was breathing comfortable with GCS 15. Pt was admitted to trauma surgery. Pt was started on multimodal pain control with tylenol, ibuprofen, oxycodone and lidocaine patch. Ortho performed ORIF of left clavicle on 3/18. The patient tolerated the procedure well, there were no complications. The patient was extubated in the OR, transferred to the PACU in stable condition and then transferred to a surgical floor. Pt was instructed to wear a sling, with removal as needed. Physical therapy and OT worked with pt, recommend discharge with outpatient PT and OT. Patient was evaluated by hospitalist for medical co-mgmt and was started on amlodipine 10mg daily for BP control. Pt c/o watery eye discharge, seen by optho, rec artificial tears q2h, lacrilube QHS and outpatient follow up.  Sutures over L eye lac removed prior to discharge.  At the time of discharge, the patient was hemodynamically stable, tolerating PO diet, voiding urine, passing stool, ambulating and was comfortable with adequate pain control. The patient was instructed to follow up with Dr. Bautitsa within 1-2 weeks after discharge. The patient felt comfortable with discharge. The patient was discharged to home. The patient had no other issues.

## 2021-03-19 NOTE — DISCHARGE NOTE PROVIDER - NSDCMRMEDTOKEN_GEN_ALL_CORE_FT
Pt states he takes no medications because he has no insurance:    acetaminophen 325 mg oral tablet: 3 tab(s) orally every 8 hours, As needed, Mild Pain (1 - 3)  amLODIPine 10 mg oral tablet: 1 tab(s) orally once a day  lidocaine 4% patch: Apply topically to affected area once a day, As Needed  Multiple Vitamins oral tablet: 1 tab(s) orally once a day  ocular lubricant ophthalmic ointment: 1 application to each affected eye once a day (at bedtime)  ocular lubricant ophthalmic solution: 1 drop(s) to each affected eye every 2 hours  oxyCODONE 5 mg oral tablet: 1 tab(s) orally every 8 hours, As Needed MDD:4 tabs  polyethylene glycol 3350 oral powder for reconstitution: 17 gram(s) orally once a day  Pt states he takes no medications because he has no insurance:    acetaminophen 325 mg oral tablet: 3 tab(s) orally every 8 hours, As needed, Mild Pain (1 - 3)  amLODIPine 10 mg oral tablet: 1 tab(s) orally once a day  lidocaine 4% patch: Apply topically to affected area once a day, As Needed  Multiple Vitamins oral tablet: 1 tab(s) orally once a day  ocular lubricant ophthalmic ointment: 1 application to each affected eye once a day (at bedtime)  ocular lubricant ophthalmic solution: 1 drop(s) to each affected eye every 2 hours  Outpatient Physical Therapy: Apply topically to affected area 3 times a week   oxyCODONE 5 mg oral tablet: 1 tab(s) orally every 8 hours, As Needed MDD:4 tabs  polyethylene glycol 3350 oral powder for reconstitution: 17 gram(s) orally once a day  Pt states he takes no medications because he has no insurance:

## 2021-03-19 NOTE — PROGRESS NOTE ADULT - ATTENDING COMMENTS
For ORIF L clavicle today.  r/b/a disucussed
PT, dvt ppx, f/u labs. dc planning
Pt feeling well, working with OT POD 1 ORIF L clavicle. Pending final PT/OT recs. Pain well controlled.
Pt seen and examined with resident/PA team, agree with above.    S/p fall down steps with L rib, clavicle, and scapula fractures. OR with Ortho today. Tertiary survey. Will d/c sutures on periorbital lac tomorrow.

## 2021-03-19 NOTE — DISCHARGE NOTE NURSING/CASE MANAGEMENT/SOCIAL WORK - NSDCFUADDAPPT_GEN_ALL_CORE_FT
Please follow up with a primary care physician within 1 week.  Please call [phone] to schedule an appointment with our family medicine clinic.    Please follow up with your Trauma Doctor, Dr. Delong if needed at [address] [facility] [address] [facility] [address] [address] , phone #[phone].    Please make an appointment with ophthalmology within 1-2 weeks.    [address] [facility] [place] [address]  Please call [phone] for an appointment

## 2021-03-19 NOTE — DISCHARGE NOTE PROVIDER - NSDCFUADDAPPT_GEN_ALL_CORE_FT
Please follow up with your Trauma Doctor, Dr. Delong only if needed at [address] [place] [address] [address], [place] [address] , phone #[phone].       Please follow up with your Trauma Doctor, Dr. Delong if needed at [address] [place] [address] [address], [place] [address] , phone #[phone].    Please make an appointment with ophthalmology within 1-2 weeks.       Please follow up with a primary care physician within 1 week.  Please call [phone] to schedule an appointment with our family medicine clinic.    Please follow up with your Trauma Doctor, Dr. Delong if needed at [address] [place] [address] [address] [place] [address] , phone #[phone].    Please make an appointment with ophthalmology within 1-2 weeks.       Please follow up with a primary care physician within 1 week.  Please call [phone] to schedule an appointment with our family medicine clinic.    Please follow up with your Trauma Doctor, Dr. Delong if needed at [address] [facility] [address] [address] , phone #[phone].    Please make an appointment with ophthalmology within 1-2 weeks.    [address] [facility] [place] [address]  Please call [phone] for an appointment

## 2021-03-19 NOTE — DISCHARGE NOTE PROVIDER - NSDCCPCAREPLAN_GEN_ALL_CORE_FT
PRINCIPAL DISCHARGE DIAGNOSIS  Diagnosis: Clavicular fracture  Assessment and Plan of Treatment: BATHING: Please do not submerge wound underwater. You may shower and/or sponge bathe.  ACTIVITY: Please wear your sling for support, you may take it off when needed. No heavy lifting anything more than 10-15lbs or straining. Otherwise, you may return to your usual level of physical activity. If you are taking narcotic pain medication (such as Percocet), do NOT drive a car, operate machinery or make important decisions.  DIET: Regular diet  NOTIFY YOUR SURGEON IF: You have any bleeding that does not stop, any pus draining from your wound, any fever (over 100.4 F) or chills, persistent nausea/vomiting with inability to tolerate food or liquids, persistent diarrhea, or if your pain is not controlled on your discharge pain medications.  FOLLOW-UP:  1. Please call to make a follow-up appointment within 1-2 weeks of discharge with Dr. Bautista   2. Please follow up with your primary care physician in one week regarding your hospitalization.      SECONDARY DISCHARGE DIAGNOSES  Diagnosis: Rib fractures  Assessment and Plan of Treatment: Rib fractures    Diagnosis: Essential hypertension  Assessment and Plan of Treatment: Essential hypertension    Diagnosis: Scapular fracture  Assessment and Plan of Treatment:     Diagnosis: Clavicular fracture  Assessment and Plan of Treatment:      PRINCIPAL DISCHARGE DIAGNOSIS  Diagnosis: Clavicular fracture  Assessment and Plan of Treatment: BATHING: Please do not submerge wound underwater. You may shower and/or sponge bathe.  ACTIVITY: Non-weight bearing to Left upper extrmity.  Please wear your sling for support, you may take it off when needed. No heavy lifting anything more than 10-15lbs or straining. Otherwise, you may return to your usual level of physical activity. If you are taking narcotic pain medication (such as Percocet), do NOT drive a car, operate machinery or make important decisions.  DIET: Regular diet  NOTIFY YOUR SURGEON IF: You have any bleeding that does not stop, any pus draining from your wound, any fever (over 100.4 F) or chills, persistent nausea/vomiting with inability to tolerate food or liquids, persistent diarrhea, or if your pain is not controlled on your discharge pain medications.  FOLLOW-UP:  1. Please call to make a follow-up appointment within 1-2 weeks of discharge with Dr. Bautista   2. Please follow up with your primary care physician in one week regarding your hospitalization.      SECONDARY DISCHARGE DIAGNOSES  Diagnosis: Rib fractures  Assessment and Plan of Treatment: .FOLLOW UP: Please follow up if needed with your trauma surgeon.  ACTIVITY: Continue to take pain medication as needed to allow for deep breathing exercises. Please continue deep breathing exercises to prevent pneumonia, and wean pain medications over the next several days.  DIET: You may return to your regular diet.     PRINCIPAL DISCHARGE DIAGNOSIS  Diagnosis: Clavicular fracture  Assessment and Plan of Treatment: BATHING: Please do not submerge wound underwater. You may shower and/or sponge bathe.  ACTIVITY: Non-weight bearing to Left upper extrmity.  Please wear your sling for support, you may take it off when needed. No heavy lifting anything more than 10-15lbs or straining. Otherwise, you may return to your usual level of physical activity. If you are taking narcotic pain medication (such as Percocet), do NOT drive a car, operate machinery or make important decisions.  DIET: Regular diet  NOTIFY YOUR SURGEON IF: You have any bleeding that does not stop, any pus draining from your wound, any fever (over 100.4 F) or chills, persistent nausea/vomiting with inability to tolerate food or liquids, persistent diarrhea, or if your pain is not controlled on your discharge pain medications.  FOLLOW-UP:  1. Please call to make a follow-up appointment within 1-2 weeks of discharge with Dr. Bautista   2. Please follow up with your primary care physician in one week regarding your hospitalization.      SECONDARY DISCHARGE DIAGNOSES  Diagnosis: Hypertension  Assessment and Plan of Treatment: You were started on amlodipine 10mg daily for high blood pressure.  a prescription was sent to your pharmacy.  Please continue taking this medication and follow up with your primary care physician within 1-2 weeks for further blood pressure titration/monitoring.    Diagnosis: Conjunctival injection, bilateral  Assessment and Plan of Treatment: Please continue aritifical tears every 2 hours, lacrilube at night time.  Please make an appointment to follow up with ophthalmology within 1-2 weeks.    Diagnosis: Rib fractures  Assessment and Plan of Treatment: .FOLLOW UP: Please follow up if needed with your trauma surgeon.  ACTIVITY: Continue to take pain medication as needed to allow for deep breathing exercises. Please continue deep breathing exercises to prevent pneumonia, and wean pain medications over the next several days.  DIET: You may return to your regular diet.

## 2021-03-19 NOTE — PROGRESS NOTE ADULT - ASSESSMENT
56M otherwise healthy presents as trauma transfer from [facility] three days after mechanical fall down 3 steps. Pt states he was drinking with his family on Saturday when he had tripped and fell down 3 steps onto L side, with his glasses breaking on his face (frame broke, lens intact, no shattered glass). Went to ED, had normal CT head/c-spine, ED repaired L eyebrow facial lac and sent patient home. On 3/15, patient began having L chest wall pain and L collarbone pain, presented to [facility] ED, CT chest shows acute displaced L mid clavicle fracture, L scapular fracture, and L 3-7 lateral rib fx minimally displaced with L 5-7 displaced posterior rib fx. Transferred to [facility] for trauma surgery evaluation. Patient now POD#1 s/p ORIF L. clavicle w/ ortho.    - Pain control  - Ortho reccs- NWB LUE, sling for comfort, sling may be removed PRN, ROM as tolerated LUE  - OOB/IS  - PT/OT reccs: Home with outpatient PT/OT  - DVT ppx- lovenox  - Dispo planning    [facility] [phone]

## 2021-03-19 NOTE — DISCHARGE NOTE PROVIDER - NSFOLLOWUPCLINICS_GEN_ALL_ED_FT
[facility] - Ophthalmology  Ophthalmology  [address] [facility]  [place] [address]  Phone: [phone]  Fax:   Follow Up Time: 1 week     [facility] - Primary Care  Primary Care  [address] [place] [address]  Phone: [phone]  Fax:   Follow Up Time: 1 week    [facility] - Ophthalmology  Ophthalmology  [address] [facility] [address], [address]  [place] [address]  Phone: [phone]  Fax:   Follow Up Time: 1 week     [facility] - Primary Care  Primary Care  [address] [place] [address]  Phone: [phone]  Fax:   Follow Up Time: 1 week

## 2021-03-19 NOTE — DISCHARGE NOTE PROVIDER - NSDCACTIVITY_GEN_ALL_CORE
Do not drive or operate machinery/Showering allowed/No heavy lifting/straining Do not drive or operate machinery/Showering allowed/Walking - Indoors allowed/No heavy lifting/straining/Walking - Outdoors allowed

## 2021-03-19 NOTE — CONSULT NOTE ADULT - ASSESSMENT
56M otherwise healthy presents as trauma transfer from [facility] three days after mechanical fall down 3 steps, requiring repair of left eyebrow laceration, and found to have displaced mid-clavicular fracture on the left, now s/p ORIF. The patient reports burning and watery discharge of both eyes for the past few days. No acute changes in vision, but reports that the right eye has had poorer vision for over a year. On exam: VA 20/70 OD, 20/30 OS. No APD. IOP wnl. CVFs full. Color plates 5/12 OD and 11/12 OS. Anterior exam with small ptg nasally OD; both eyes with 2+ SPK OU. On DFE, there is an ERM OD>>OS and a CRS OD.    Dry eye syndrome OU  - Artificial tears preservative free to both eyes q2 hours while awake  - Lacrilube ointment at bedtime to both eyes    ERM OU  - Will need further outpatient testing including OCT macula and evaluation by retina as it is likely visually significant    SDW Dr. Blue (attending)    Follow-Up:  Patient should follow up with his/her ophthalmologist or in the [facility] within 1 week of discharge:    [address] [facility] [address] [facility]  [place], [place] [id number] [phone]

## 2021-03-19 NOTE — PROGRESS NOTE ADULT - SUBJECTIVE AND OBJECTIVE BOX
ACS SURGERY DAILY PROGRESS NOTE:       SUBJECTIVE/ROS: Patient seen and examined at bedside.  No acute overnight events.  Pain is well controlled.  Patient c/o b/l watery discharge from his eyes.      MEDICATIONS  (STANDING):  amLODIPine   Tablet 10 milliGRAM(s) Oral daily  artificial  tears Solution 1 Drop(s) Both EYES two times a day  enoxaparin Injectable 40 milliGRAM(s) SubCutaneous every 24 hours  folic acid 1 milliGRAM(s) Oral daily  influenza   Vaccine 0.5 milliLiter(s) IntraMuscular once  lidocaine   Patch 1 Patch Transdermal daily  multivitamin 1 Tablet(s) Oral daily  polyethylene glycol 3350 17 Gram(s) Oral daily  senna 2 Tablet(s) Oral at bedtime  thiamine 100 milliGRAM(s) Oral daily    MEDICATIONS  (PRN):  acetaminophen   Tablet .. 975 milliGRAM(s) Oral every 8 hours PRN Mild Pain (1 - 3)  HYDROmorphone  Injectable 0.25 milliGRAM(s) IV Push every 3 hours PRN Severe Pain (7 - 10)  oxyCODONE    IR 2.5 milliGRAM(s) Oral every 4 hours PRN Moderate Pain (4 - 6)  oxyCODONE    IR 5 milliGRAM(s) Oral every 4 hours PRN Severe Pain (7 - 10)      OBJECTIVE:    Vital Signs Last 24 Hrs  T(C): 36.7 (19 Mar 2021 08:37), Max: 37.3 (19 Mar 2021 05:35)  T(F): 98 (19 Mar 2021 08:37), Max: 99.1 (19 Mar 2021 05:35)  HR: 98 (19 Mar 2021 10:50) (72 - 101)  BP: 138/80 (19 Mar 2021 08:37) (131/87 - 153/98)  BP(mean): 109 (18 Mar 2021 19:15) (102 - 115)  RR: 16 (19 Mar 2021 08:37) (14 - 18)  SpO2: 94% (19 Mar 2021 10:50) (93% - 99%)        I&O's Detail    18 Mar 2021 07:01  -  19 Mar 2021 07:00  --------------------------------------------------------  IN:    Oral Fluid: 300 mL    sodium chloride 0.9%: 1242 mL  Total IN: 1542 mL    OUT:    Voided (mL): 1700 mL  Total OUT: 1700 mL    Total NET: -158 mL      19 Mar 2021 07:01  -  19 Mar 2021 12:36  --------------------------------------------------------  IN:  Total IN: 0 mL    OUT:    Voided (mL): 300 mL  Total OUT: 300 mL    Total NET: -300 mL          Daily Height in cm: 162.56 (18 Mar 2021 14:57)    Daily     LABS:                        14.4   13.35 )-----------( 246      ( 19 Mar 2021 06:31 )             44.4     03-19    136  |  99  |  14  ----------------------------<  104<H>  4.0   |  24  |  0.81    Ca    9.5      19 Mar 2021 06:31  Phos  3.9     03-19  Mg     2.2     03-19      PT/INR - ( 18 Mar 2021 04:58 )   PT: 12.0 sec;   INR: 1.00 ratio         PTT - ( 18 Mar 2021 04:58 )  PTT:29.7 sec              PHYSICAL EXAM:  General: NAD  Eyes: watery discharge b/l minimal injection b/l  Pulm: No increased WOB  Laterality: Left shoulder dressing c/d/i; sling in place  Compartments soft, non-tender

## 2021-03-19 NOTE — PHYSICAL THERAPY INITIAL EVALUATION ADULT - ADDITIONAL COMMENTS
Problem: Patient Care Overview (Adult)  Goal: Plan of Care Review  Outcome: Ongoing (interventions implemented as appropriate)   03/02/18 3416   Coping/Psychosocial Response Interventions   Plan Of Care Reviewed With patient   Patient Care Overview   Progress progress towards functional goals is fair   Outcome Evaluation   Outcome Summary/Follow up Plan Pt is increasing with activity tolerance but is unsafe with amb and transfers requiring more A         
pt lives with sister and family in home, +4 steps to enter home with b/l railing, available bed/bath on 1st floor, PTA ind amb and ADLs, no device, family can assist as needed, recent move from [place]
pt lives with sister and family in home, +4 steps to enter home with b/l railing, available bed/bath on 1st floor, PTA ind amb and ADLs, no device, family can assist as needed, recent move from [place]

## 2021-03-19 NOTE — PHYSICAL THERAPY INITIAL EVALUATION ADULT - GENERAL OBSERVATIONS, REHAB EVAL
Pt receive supine in bed, +IV
Pt is s/p fall, +L scapula, L clavicle fractures, 3-7 rib fractures, per ortho NWB LUE in sling. Plan for ORIF L clavicle on 3/18/21. Pt received sitting in chair, +ICU monitoring, +IVL, +sling LUE (donned correctly, OT Nyla at side), sister at bedside during eval. Pt is A&OX4, follows all commands, BP elevated (RN Apolonia aware and provided meds). Pt moving all extremities, +LUE shld flex NT.

## 2021-03-19 NOTE — PROGRESS NOTE ADULT - PROBLEM SELECTOR PLAN 4
Blood pressures were significantly elevated initially during hospital stay. Suspect pt with undiagnosed hypertension. SBP goal <140. BP's now better since initiation of Norvasc  - c/w Norvasc 10mg daily. Will take 2 weeks to achieve peak effect  - Hydralazine PRN SBP >190  - Monitor BP, will introduce further antihypertensive therapy (Likely Lisinopril) as needed. Outpt f/u for medication titration

## 2021-03-19 NOTE — DISCHARGE NOTE PROVIDER - CARE PROVIDER_API CALL
Jimbo Bautista (MD)  Orthopaedic Surgery  [address]  [place] [address]  Phone: [phone]  Fax: [phone]  Follow Up Time: 1 week

## 2021-03-19 NOTE — PROGRESS NOTE ADULT - PROBLEM SELECTOR PLAN 7
Pt has not seen a physician since coming to the  in 2000. Will need PCP on discharge. Given lack of insurance pt could follow up at the medicine clinic at [address] [place] [address] Phone: [phone].

## 2021-03-19 NOTE — CONSULT NOTE ADULT - SUBJECTIVE AND OBJECTIVE BOX
[facility] DEPARTMENT OF OPHTHALMOLOGY - INITIAL ADULT CONSULT  -----------------------------------------------------------------------------------------------------  Estefany Hercules MD PGY-3  Pager: [phone]  -----------------------------------------------------------------------------------------------------    HPI: [id number] otherwise healthy presents as trauma transfer from [facility] three days after mechanical fall down 3 steps. Pt states he was drinking with his family on Saturday when he had tripped and fell down 3 steps onto L side, with his glasses breaking on his face (frame broke, lens intact, no shattered glass). Went to ED, had normal CT head/c-spine, ED repaired L eyebrow facial lac and sent patient home. On 3/15, patient began having L chest wall pain and L collarbone pain, presented to [facility] ED, CT chest showing acute displaced L mid clavicle fracture, L scapular fracture, and L 3-7 lateral rib fx minimally displaced with L 5-7 displaced posterior rib fx. Transferred to [facility] for trauma surgery evaluation. Now s/p ORIF of the left clavicle.    Interval history: Ophthalmology consulted due to bilateral eye pain and watery discharge. The patient reports that after the fall, he noticed that the left eye developed watery discharge. Two days ago, the right eye also developed watery discharge. He also notes burning of both eyes for the past few days. He saw an ophthalmologist 1 year ago and was told that he has a problem with the right eye causing distortion and was told that he would need a procedure for this. No flashes or floaters. No acute changes in vision.    PAST MEDICAL & SURGICAL HISTORY:  No pertinent past medical history  No significant past surgical history    Past Ocular History: as above    FAMILY HISTORY: no family history of RD    Social History: denies smoking, occasionally drinks with his family on the weekends    MEDICATIONS  (STANDING):  amLODIPine   Tablet 10 milliGRAM(s) Oral daily  artificial  tears Solution 1 Drop(s) Both EYES every 2 hours  enoxaparin Injectable 40 milliGRAM(s) SubCutaneous every 24 hours  folic acid 1 milliGRAM(s) Oral daily  influenza   Vaccine 0.5 milliLiter(s) IntraMuscular once  lidocaine   Patch 1 Patch Transdermal daily  multivitamin 1 Tablet(s) Oral daily  petrolatum Ophthalmic Ointment 1 Application(s) Both EYES at bedtime  polyethylene glycol 3350 17 Gram(s) Oral daily  senna 2 Tablet(s) Oral at bedtime  thiamine 100 milliGRAM(s) Oral daily    MEDICATIONS  (PRN):  acetaminophen   Tablet .. 975 milliGRAM(s) Oral every 8 hours PRN Mild Pain (1 - 3)  HYDROmorphone  Injectable 0.25 milliGRAM(s) IV Push every 3 hours PRN Severe Pain (7 - 10)  oxyCODONE    IR 2.5 milliGRAM(s) Oral every 4 hours PRN Moderate Pain (4 - 6)  oxyCODONE    IR 5 milliGRAM(s) Oral every 4 hours PRN Severe Pain (7 - 10)    Allergies & Intolerances: NKDA    Review of Systems:  Constitutional: No fever, chills  Eyes: No flashes, floaters, erythema, double vision OU; +watery discharge OU  Neuro: No tremors  Cardiovascular: No chest pain, palpitations  Respiratory: No SOB, no cough  GI: No nausea, vomiting, abdominal pain  : No dysuria  Skin: no rash  Psych: no depression  Endocrine: no polyuria, polydipsia  Heme/lymph: no swelling    VITALS: T(C): 36.8 (03-19-21 @ 13:18)  T(F): 98.3 (03-19-21 @ 13:18), Max: 99.1 (03-19-21 @ 05:35)  HR: 101 (03-19-21 @ 13:18) (72 - 101)  BP: 163/90 (03-19-21 @ 13:18) (131/87 - 163/90)  RR:  (14 - 18)  SpO2:  (93% - 100%)    Alert and oriented x 3 ; appropriate mood and affect    Ophthalmology Exam:  Visual acuity (sc): 20/70 OD, 20/30 OS NIPH  Pupils: PERRL OU, no APD  Ttono: 16 OU  Extraocular movements (EOMs): Full OU, no pain, no diplopia  Confrontational Visual Field (CVF): Full OU  Color Plates: 5/12 OD, 11/12 OS    Pen Light Exam (PLE)  External: Flat OU  Lids/Lashes/Lacrimal Ducts: Flat OU, very trace periocular ecchymosis OS    Sclera/Conjunctiva: W+Q OU  Cornea: ptg nasally OD, 2+SPK OU  Anterior Chamber: D+F OU    Iris: Flat OU  Lens: NS OU    Fundus Exam: dilated with 1% tropicamide and 2.5% phenylephrine   Approval obtained from primary team for dilation  Patient aware that pupils can remained dilated for at least 4-6 hours  Exam performed with 20D lens    Vitreous: wnl OU  Disc, cup/disc: sharp and pink, 0.4 OU, tilted disc OU  Macula: ERM OD>>OS, CRS OD  Vessels: wnl OU  Periphery: wnl OU

## 2021-03-19 NOTE — PROGRESS NOTE ADULT - PROBLEM SELECTOR PLAN 6
Possibly 2/2 conjunctivitis (viral)  - Consider optho eval  - Could trial Ketotifen  - Warm compresses to area

## 2021-03-19 NOTE — PHYSICAL THERAPY INITIAL EVALUATION ADULT - PERTINENT HX OF CURRENT PROBLEM, REHAB EVAL
Pt is a 56y Male admitted to [facility] on 3/17/21 with no significant PMH presenting after falling down the stairs on Saturday s/p repair of L eyebrow laceration presenting now with worsening L sided rib pain. +Found to have L scapula, L clavicle fractures, 3-7 rib fractures. Per ortho, NWB LUE in sling, +L clavicle and L scapula fx.  ORIF for clavicle.  non op for scapula. plan for ORIF thursday 3/18/21.
Pt is a 56y Male admitted to [facility] on 3/17/21 with no significant PMH presenting after falling down the stairs on Saturday s/p repair of L eyebrow laceration presenting now with worsening L sided rib pain. +Found to have L scapula, L clavicle fractures, 3-7 rib fractures. Per ortho, NWB LUE in sling, +L clavicle and L scapula fx.  ORIF for clavicle.  non op for scapula. Pt s/p L clavicle ORIF; LUE NWB; sling for comfort may be removed PRN; pendulum/codman/wall climbing; LUE ROM as ximena.

## 2021-03-19 NOTE — PROGRESS NOTE ADULT - SUBJECTIVE AND OBJECTIVE BOX
Orthopedic Surgery Progress Note  S: Pain is well controlled.  No nausea, vomiting, chest pain, shortness of breath, lightheadedness, or dizziness. Patient eager to work with PT today and hopeful he can go home after that.    O:  Vital Signs Last 24 Hrs  T(C): 37.3 (19 Mar 2021 05:35), Max: 37.3 (19 Mar 2021 05:35)  T(F): 99.1 (19 Mar 2021 05:35), Max: 99.1 (19 Mar 2021 05:35)  HR: 86 (19 Mar 2021 05:35) (72 - 107)  BP: 150/84 (19 Mar 2021 05:35) (131/87 - 153/98)  BP(mean): 109 (18 Mar 2021 19:15) (102 - 115)  RR: 16 (19 Mar 2021 05:35) (14 - 18)  SpO2: 94% (19 Mar 2021 05:35) (93% - 99%)    Gen: NAD  LLE  sling on  aquacel dressing clean, dry, intact  SILT C5-T1  Fires EPL/FDP/IO/biceps/triceps/delt  WWP distally, < 2 sec cap refill                        14.3   23.01 )-----------( 268      ( 18 Mar 2021 18:25 )             43.4                         15.2   9.12  )-----------( 263      ( 18 Mar 2021 04:58 )             45.7     03-18    137  |  99  |  12  ----------------------------<  127<H>  4.0   |  23  |  0.84    PT/INR - ( 18 Mar 2021 04:58 )   PT: 12.0 sec;   INR: 1.00 ratio       PTT - ( 18 Mar 2021 04:58 )  PTT:29.7 sec    A/P 56y year old Male POD1 s/p L clavicle ORIF, doing well    Pain Control  NWB LUE in sling  PT/OT/OOB  DVT PPx: lovenox  Dispo Planning: pending PT recs, possible DC home today if cleared by PT and trauma team    Taiwo Portillo MD Orthopedic Surgery Progress Note  S: Pain is well controlled.  No nausea, vomiting, chest pain, shortness of breath, lightheadedness, or dizziness. Patient eager to work with PT today and hopeful he can go home after that.    O:  Vital Signs Last 24 Hrs  T(C): 37.3 (19 Mar 2021 05:35), Max: 37.3 (19 Mar 2021 05:35)  T(F): 99.1 (19 Mar 2021 05:35), Max: 99.1 (19 Mar 2021 05:35)  HR: 86 (19 Mar 2021 05:35) (72 - 107)  BP: 150/84 (19 Mar 2021 05:35) (131/87 - 153/98)  BP(mean): 109 (18 Mar 2021 19:15) (102 - 115)  RR: 16 (19 Mar 2021 05:35) (14 - 18)  SpO2: 94% (19 Mar 2021 05:35) (93% - 99%)    Gen: NAD  LLE  sling on  aquacel dressing clean, dry, intact  SILT C5-T1  Fires EPL/FDP/IO/biceps/triceps/delt  WWP distally, < 2 sec cap refill                        14.3   23.01 )-----------( 268      ( 18 Mar 2021 18:25 )             43.4                         15.2   9.12  )-----------( 263      ( 18 Mar 2021 04:58 )             45.7     03-18    137  |  99  |  12  ----------------------------<  127<H>  4.0   |  23  |  0.84    PT/INR - ( 18 Mar 2021 04:58 )   PT: 12.0 sec;   INR: 1.00 ratio       PTT - ( 18 Mar 2021 04:58 )  PTT:29.7 sec    A/P 56y year old Male POD1 s/p L clavicle ORIF, doing well    Pain Control  NWB LUE, sling for comfort  sling may be removed PRN  ROM as tolerated LUE  aquacel dressing  shower OK  PT/OT- pendulum/codman/wall climbing  OOB as tolerated  DVT PPx: lovenox, can switch to ASA on DC  Dispo Planning: pending PT recs, possible DC home today if cleared by PT and trauma team    Taiwo Portillo MD

## 2021-03-19 NOTE — PHYSICAL THERAPY INITIAL EVALUATION ADULT - DISCHARGE DISPOSITION, PT EVAL
home/outpatient PT
DC home with no skilled PT needs, assist from sister & family as needed, CARROLL Templeton aware./no skilled PT needs

## 2021-03-30 ENCOUNTER — APPOINTMENT (OUTPATIENT)
Dept: ORTHOPEDIC SURGERY | Facility: CLINIC | Age: 56
End: 2021-03-30
Payer: MEDICAID

## 2021-03-30 PROCEDURE — 99024 POSTOP FOLLOW-UP VISIT: CPT

## 2021-04-20 ENCOUNTER — APPOINTMENT (OUTPATIENT)
Dept: INTERNAL MEDICINE | Facility: CLINIC | Age: 56
End: 2021-04-20

## 2021-04-20 ENCOUNTER — NON-APPOINTMENT (OUTPATIENT)
Age: 56
End: 2021-04-20

## 2021-04-20 ENCOUNTER — OUTPATIENT (OUTPATIENT)
Dept: OUTPATIENT SERVICES | Facility: HOSPITAL | Age: 56
LOS: 1 days | End: 2021-04-20
Payer: MEDICAID

## 2021-04-20 VITALS
BODY MASS INDEX: 22.2 KG/M2 | WEIGHT: 130 LBS | HEART RATE: 99 BPM | SYSTOLIC BLOOD PRESSURE: 142 MMHG | OXYGEN SATURATION: 97 % | HEIGHT: 64 IN | DIASTOLIC BLOOD PRESSURE: 90 MMHG

## 2021-04-20 DIAGNOSIS — Z82.49 FAMILY HISTORY OF ISCHEMIC HEART DISEASE AND OTHER DISEASES OF THE CIRCULATORY SYSTEM: ICD-10-CM

## 2021-04-20 DIAGNOSIS — I10 ESSENTIAL (PRIMARY) HYPERTENSION: ICD-10-CM

## 2021-04-20 DIAGNOSIS — H54.7 UNSPECIFIED VISUAL LOSS: ICD-10-CM

## 2021-04-20 DIAGNOSIS — Z87.891 PERSONAL HISTORY OF NICOTINE DEPENDENCE: ICD-10-CM

## 2021-04-20 DIAGNOSIS — Z82.5 FAMILY HISTORY OF ASTHMA AND OTHER CHRONIC LOWER RESPIRATORY DISEASES: ICD-10-CM

## 2021-04-20 DIAGNOSIS — H11.001 UNSPECIFIED PTERYGIUM OF RIGHT EYE: ICD-10-CM

## 2021-04-20 PROCEDURE — G0463: CPT

## 2021-04-20 PROCEDURE — 80061 LIPID PANEL: CPT

## 2021-04-20 PROCEDURE — 83036 HEMOGLOBIN GLYCOSYLATED A1C: CPT

## 2021-04-20 PROCEDURE — 85027 COMPLETE CBC AUTOMATED: CPT

## 2021-04-20 PROCEDURE — 80053 COMPREHEN METABOLIC PANEL: CPT

## 2021-04-21 ENCOUNTER — APPOINTMENT (OUTPATIENT)
Dept: ORTHOPEDIC SURGERY | Facility: CLINIC | Age: 56
End: 2021-04-21
Payer: MEDICAID

## 2021-04-21 LAB
A1C WITH ESTIMATED AVERAGE GLUCOSE RESULT: 5.5 % — SIGNIFICANT CHANGE UP (ref 4–5.6)
ALBUMIN SERPL ELPH-MCNC: 4.1 G/DL — SIGNIFICANT CHANGE UP (ref 3.3–5)
ALP SERPL-CCNC: 151 U/L — HIGH (ref 40–120)
ALT FLD-CCNC: 14 U/L — SIGNIFICANT CHANGE UP (ref 10–45)
ANION GAP SERPL CALC-SCNC: 11 MMOL/L — SIGNIFICANT CHANGE UP (ref 5–17)
AST SERPL-CCNC: 17 U/L — SIGNIFICANT CHANGE UP (ref 10–40)
BILIRUB SERPL-MCNC: 0.2 MG/DL — SIGNIFICANT CHANGE UP (ref 0.2–1.2)
BUN SERPL-MCNC: 14 MG/DL — SIGNIFICANT CHANGE UP (ref 7–23)
CALCIUM SERPL-MCNC: 9.5 MG/DL — SIGNIFICANT CHANGE UP (ref 8.4–10.5)
CHLORIDE SERPL-SCNC: 102 MMOL/L — SIGNIFICANT CHANGE UP (ref 96–108)
CHOLEST SERPL-MCNC: 205 MG/DL — HIGH
CO2 SERPL-SCNC: 25 MMOL/L — SIGNIFICANT CHANGE UP (ref 22–31)
CREAT SERPL-MCNC: 0.84 MG/DL — SIGNIFICANT CHANGE UP (ref 0.5–1.3)
ESTIMATED AVERAGE GLUCOSE: 111 MG/DL — SIGNIFICANT CHANGE UP (ref 68–114)
GLUCOSE SERPL-MCNC: 117 MG/DL — HIGH (ref 70–99)
HCT VFR BLD CALC: 39.9 % — SIGNIFICANT CHANGE UP (ref 39–50)
HDLC SERPL-MCNC: 54 MG/DL — SIGNIFICANT CHANGE UP
HGB BLD-MCNC: 12.8 G/DL — LOW (ref 13–17)
LIPID PNL WITH DIRECT LDL SERPL: 128 MG/DL — HIGH
MCHC RBC-ENTMCNC: 29.5 PG — SIGNIFICANT CHANGE UP (ref 27–34)
MCHC RBC-ENTMCNC: 32.1 GM/DL — SIGNIFICANT CHANGE UP (ref 32–36)
MCV RBC AUTO: 91.9 FL — SIGNIFICANT CHANGE UP (ref 80–100)
NON HDL CHOLESTEROL: 151 MG/DL — HIGH
PLATELET # BLD AUTO: 308 K/UL — SIGNIFICANT CHANGE UP (ref 150–400)
POTASSIUM SERPL-MCNC: 4.3 MMOL/L — SIGNIFICANT CHANGE UP (ref 3.5–5.3)
POTASSIUM SERPL-SCNC: 4.3 MMOL/L — SIGNIFICANT CHANGE UP (ref 3.5–5.3)
PROT SERPL-MCNC: 7.3 G/DL — SIGNIFICANT CHANGE UP (ref 6–8.3)
RBC # BLD: 4.34 M/UL — SIGNIFICANT CHANGE UP (ref 4.2–5.8)
RBC # FLD: 13 % — SIGNIFICANT CHANGE UP (ref 10.3–14.5)
SODIUM SERPL-SCNC: 139 MMOL/L — SIGNIFICANT CHANGE UP (ref 135–145)
TRIGL SERPL-MCNC: 114 MG/DL — SIGNIFICANT CHANGE UP
WBC # BLD: 7.5 K/UL — SIGNIFICANT CHANGE UP (ref 3.8–10.5)
WBC # FLD AUTO: 7.5 K/UL — SIGNIFICANT CHANGE UP (ref 3.8–10.5)

## 2021-04-21 PROCEDURE — 73000 X-RAY EXAM OF COLLAR BONE: CPT | Mod: LT

## 2021-04-21 PROCEDURE — 99024 POSTOP FOLLOW-UP VISIT: CPT

## 2021-04-23 PROBLEM — Z82.49 FAMILY HISTORY OF CARDIAC DISORDER: Status: ACTIVE | Noted: 2021-04-20

## 2021-04-23 RX ORDER — AMLODIPINE BESYLATE 10 MG/1
10 TABLET ORAL DAILY
Qty: 90 | Refills: 2 | Status: ACTIVE | COMMUNITY
Start: 2021-04-23 | End: 1900-01-01

## 2021-04-23 NOTE — HISTORY OF PRESENT ILLNESS
[FreeTextEntry1] : CPE [de-identified] : 55yo PMH fractured L clavicle presents for CPE.\par \par Does not see doctor regularly, last saw 7 years ago.\par \par Reports decreased visual acuity. When reading for a long time he gets some headache. Denies double vision, eye pain, itchy eyes, eye discharge. Night vision is worse. And R eye vision is worse than L eye. Wears glasses which are "high prescription". Last saw optometry 1.5-2 years ago.\par \par In March, had two drinks then fell on basement stairs and fractured his L clavicle s/p ORIF w/ Dr. Bautista. Now no longer drinks at all. Previously would drink alcohol on the weekends only.\par \par Accompanied by sister Rosalva Montano (who he lives with)\par Diet: has minimal meat\par \par All: none\par Meds: none\par \par FHx:\par dad - asthma, HTN, heart dz s/p \par mom - passed \par \par PMH:\par Denies any PMH\par \par Surgical Hx:\par L clavicle fx repair\par \par SHx: \par \par Used to work as a , currently not working due to problem with eyes.\par Moved from [place] 20 years ago\par Lives with sister and father. , no children.\par \par Never smoked\par No alcohol now, but used to have on weekends\par Denies drug use.\par

## 2021-04-23 NOTE — PHYSICAL EXAM
[PERRL] : pupils equal round and reactive to light [EOMI] : extraocular movements intact [Normal] : no rash [de-identified] : R eye pterygium, no injection of conjunctiva [de-identified] : Poor dentition with severe gingivitis [de-identified] : gynecomastia L>R

## 2021-04-23 NOTE — ASSESSMENT
[FreeTextEntry1] : 57yo PMH fractured L clavicle presents for CPE.\par \par #HTN\par -Addendum: s/p lab work in [facility] done on 4/20\par -pt was prescribed amlodipine 10mg at hospital visit, last took 10 days ago \par -new script given for amlodipine 10mg qd, RTC 5 weeks for BP check\par \par #Decreased visual acuity and R eye pterygium\par -opthalmology referral: pt planning to see sister's ophtho\par \par #severe gingivitis\par advised to see dentist ASAP\par \par #HCM\par -CBC, CMP, a1c, lipid panel: results in [facility] and showed: mild normocytic anemia with Hgb 12.8; BMP wnl; a1c 5.5, \par -FIT testing given (per pt preference, prefers over colonoscopy)\par -RTC 5-10 weeks ideally for BP medication\par -Pt eventually plans to switch care to sister's family doctor, told pt and sister this is fine and if not able to get sooner appt with the family doc can follow with us in the meantime\par \par ADDENDUM:\par Discussed lab results (seen on [facility])\par \par #HLD\par ASCVD of 9.0%\par Can discuss with patient at next visit to decide whether to start statin\par No Fhx of premature CAD - father had stent placed at age 70s\par \par

## 2021-04-28 DIAGNOSIS — H11.001 UNSPECIFIED PTERYGIUM OF RIGHT EYE: ICD-10-CM

## 2021-04-28 DIAGNOSIS — Z82.49 FAMILY HISTORY OF ISCHEMIC HEART DISEASE AND OTHER DISEASES OF THE CIRCULATORY SYSTEM: ICD-10-CM

## 2021-04-28 DIAGNOSIS — Z87.891 PERSONAL HISTORY OF NICOTINE DEPENDENCE: ICD-10-CM

## 2021-04-28 DIAGNOSIS — Z82.5 FAMILY HISTORY OF ASTHMA AND OTHER CHRONIC LOWER RESPIRATORY DISEASES: ICD-10-CM

## 2021-04-28 DIAGNOSIS — H54.7 UNSPECIFIED VISUAL LOSS: ICD-10-CM

## 2021-05-27 LAB — HEMOCCULT STL QL IA: NEGATIVE

## 2021-09-03 NOTE — CONSULT NOTE ADULT - ASSESSMENT
Patient is due for an office visit. Please call patient to schedule an appointment in order to receive any further refills after this. Patient not MyChart active. This is a 55 y/o male with no significant past medical history who presented after a fall found to have acute displaced L mid clavicle fracture, L scapular fracture, and L 3-7 lateral rib fx minimally displaced with L 5-7 displaced posterior rib fx

## 2021-09-08 ENCOUNTER — APPOINTMENT (OUTPATIENT)
Dept: ORTHOPEDIC SURGERY | Facility: CLINIC | Age: 56
End: 2021-09-08
Payer: MEDICAID

## 2021-09-08 VITALS
BODY MASS INDEX: 24.75 KG/M2 | WEIGHT: 145 LBS | HEIGHT: 64 IN | HEART RATE: 82 BPM | DIASTOLIC BLOOD PRESSURE: 65 MMHG | SYSTOLIC BLOOD PRESSURE: 116 MMHG

## 2021-09-08 DIAGNOSIS — S42.022D DISPLACED FRACTURE OF SHAFT OF LEFT CLAVICLE, SUBSEQUENT ENCOUNTER FOR FRACTURE WITH ROUTINE HEALING: ICD-10-CM

## 2021-09-08 PROCEDURE — 99213 OFFICE O/P EST LOW 20 MIN: CPT

## 2021-09-08 PROCEDURE — 73000 X-RAY EXAM OF COLLAR BONE: CPT | Mod: LT

## 2021-09-11 PROBLEM — S42.022D CLOSED DISPLACED FRACTURE OF SHAFT OF LEFT CLAVICLE WITH ROUTINE HEALING, SUBSEQUENT ENCOUNTER: Status: ACTIVE | Noted: 2021-03-29

## 2021-09-11 NOTE — HISTORY OF PRESENT ILLNESS
[de-identified] : S/P ORIF LT clavicle fracture 3/17/21 [de-identified] : 57 yo m S/P ORIF LT clavicle fracture 3/17/21.  He states that he has been overall doing well.  However he is having significant left shoulder stiffness and weakness.  He has not been performing any exercise [de-identified] : Phyiscal exam LT shoulder. \par Incision CDI healed no erythema drainage or induration. \par SILT AX M U R \par AROM 0-170 degrees FF/abd \par PROM 0-170 degrees FF/abd\par He has 3+ out of 5 strength of his rotator cuff musculature.  He has 4 out of 5 strength on abduction and forward flexion.  He has 3+ out of 5 strength of his pectoralis muscles\par FROM elbow wrist and fingers \par NVI distally 2+ distal pulses  [de-identified] : 2 views L clavicle taken today and reviewed by me shows fully healed L clavicle fracture with hardware in good position no signs of mechanical failure  [de-identified] : S/P ORIF LT clavicle fracture 3/17/21\par - doing well  [de-identified] : S/P ORIF LT clavicle fracture 3/17/21\par -His fracture is fully healed.  His issue is that he is not fully rehabilitated.  He has significant muscle weakness.  He has excellent shoulder range of motion.  He is sent back to physical therapy for strengthening of his shoulder girdle musculature.  He will follow up in 3 months.

## 2021-12-01 NOTE — ED PROVIDER NOTE - MDM ORDERS SUBMITTED SELECTION
1)Continue to Elevate heels off of Mattress  2)Continue to Turn and position every 2 Hours  3)Apply Foam to Left Lateral LE wound   4) Apply Foam to Sacral wound  Labs/Imaging Studies/Medications

## 2021-12-13 ENCOUNTER — APPOINTMENT (OUTPATIENT)
Dept: ORTHOPEDIC SURGERY | Facility: CLINIC | Age: 56
End: 2021-12-13

## 2022-04-07 NOTE — PHYSICAL THERAPY INITIAL EVALUATION ADULT - ACTIVE RANGE OF MOTION EXAMINATION, REHAB EVAL
23
LUE AROM wrist/digits/elbow WFL, L shld NT, RUE WFL, BLE WFL/Right UE Active ROM was WFL (within functional limits)/Left LE Active ROM was WFL (within functional limits)/Right LE Active ROM was WFL (within functional limits)
LUE AROM wrist/digits/elbow WFL, L shld NT, RUE WFL, BLE WFL/Right UE Active ROM was WFL (within functional limits)/Left LE Active ROM was WFL (within functional limits)/Right LE Active ROM was WFL (within functional limits)

## 2022-04-29 RX ORDER — LIDOCAINE 4 G/100G
1 CREAM TOPICAL
Qty: 0 | Refills: 0 | DISCHARGE

## 2023-12-07 NOTE — ED ADULT TRIAGE NOTE - ESI TRIAGE ACUITY LEVEL, MLM
Plan: Surprisingly numerous AKs on scalp despite Tolak daily since last visit. Increased bid for 1 month. Consider imiquimod or 5FU once a quarter Render In Strict Bullet Format?: No Detail Level: Zone Discontinue Regimen: Tolak 2
